# Patient Record
Sex: FEMALE | Race: BLACK OR AFRICAN AMERICAN | NOT HISPANIC OR LATINO | Employment: UNEMPLOYED | RURAL
[De-identification: names, ages, dates, MRNs, and addresses within clinical notes are randomized per-mention and may not be internally consistent; named-entity substitution may affect disease eponyms.]

---

## 2018-02-23 LAB — CRC RECOMMENDATION EXT: NORMAL

## 2021-05-29 DIAGNOSIS — M54.50 LOW BACK PAIN: Primary | ICD-10-CM

## 2021-06-01 ENCOUNTER — OFFICE VISIT (OUTPATIENT)
Dept: FAMILY MEDICINE | Facility: CLINIC | Age: 56
End: 2021-06-01
Payer: OTHER GOVERNMENT

## 2021-06-01 VITALS
HEIGHT: 69 IN | SYSTOLIC BLOOD PRESSURE: 110 MMHG | RESPIRATION RATE: 16 BRPM | HEART RATE: 80 BPM | BODY MASS INDEX: 31.7 KG/M2 | RESPIRATION RATE: 16 BRPM | DIASTOLIC BLOOD PRESSURE: 78 MMHG | WEIGHT: 214 LBS | DIASTOLIC BLOOD PRESSURE: 79 MMHG | HEIGHT: 69 IN | WEIGHT: 215 LBS | SYSTOLIC BLOOD PRESSURE: 124 MMHG | HEART RATE: 86 BPM | BODY MASS INDEX: 31.84 KG/M2

## 2021-06-01 DIAGNOSIS — I86.8 SPIDER VARICOSE VEIN: Primary | Chronic | ICD-10-CM

## 2021-06-01 DIAGNOSIS — I10 BENIGN HYPERTENSION: ICD-10-CM

## 2021-06-01 DIAGNOSIS — M54.9 DORSALGIA, UNSPECIFIED: ICD-10-CM

## 2021-06-01 DIAGNOSIS — M54.9 BACK PAIN, UNSPECIFIED BACK LOCATION, UNSPECIFIED BACK PAIN LATERALITY, UNSPECIFIED CHRONICITY: ICD-10-CM

## 2021-06-01 LAB
ALBUMIN SERPL BCP-MCNC: 3.6 G/DL (ref 3.5–5)
ALBUMIN/GLOB SERPL: 0.9 {RATIO}
ALP SERPL-CCNC: 72 U/L (ref 41–108)
ALT SERPL W P-5'-P-CCNC: 33 U/L (ref 13–56)
ANION GAP SERPL CALCULATED.3IONS-SCNC: 9 MMOL/L (ref 7–16)
AST SERPL W P-5'-P-CCNC: 25 U/L (ref 15–37)
BILIRUB SERPL-MCNC: 0.3 MG/DL (ref 0–1.2)
BUN SERPL-MCNC: 11 MG/DL (ref 7–18)
BUN/CREAT SERPL: 12 (ref 6–20)
CALCIUM SERPL-MCNC: 9.2 MG/DL (ref 8.5–10.1)
CHLORIDE SERPL-SCNC: 104 MMOL/L (ref 98–107)
CHOLEST SERPL-MCNC: 157 MG/DL (ref 0–200)
CHOLEST/HDLC SERPL: 4.1 {RATIO}
CO2 SERPL-SCNC: 30 MMOL/L (ref 21–32)
CREAT SERPL-MCNC: 0.95 MG/DL (ref 0.55–1.02)
GLOBULIN SER-MCNC: 4.2 G/DL (ref 2–4)
GLUCOSE SERPL-MCNC: 84 MG/DL (ref 74–106)
HDLC SERPL-MCNC: 38 MG/DL (ref 40–60)
LDLC SERPL CALC-MCNC: 100 MG/DL
LDLC/HDLC SERPL: 2.6 {RATIO}
NONHDLC SERPL-MCNC: 119 MG/DL
POTASSIUM SERPL-SCNC: 3.3 MMOL/L (ref 3.5–5.1)
PROT SERPL-MCNC: 7.8 G/DL (ref 6.4–8.2)
SODIUM SERPL-SCNC: 140 MMOL/L (ref 136–145)
TRIGL SERPL-MCNC: 94 MG/DL (ref 35–150)
VLDLC SERPL-MCNC: 19 MG/DL

## 2021-06-01 PROCEDURE — 80061 LIPID PANEL: CPT | Mod: ,,, | Performed by: CLINICAL MEDICAL LABORATORY

## 2021-06-01 PROCEDURE — 99213 OFFICE O/P EST LOW 20 MIN: CPT | Mod: ,,, | Performed by: NURSE PRACTITIONER

## 2021-06-01 PROCEDURE — 80053 COMPREHENSIVE METABOLIC PANEL: ICD-10-PCS | Mod: ,,, | Performed by: CLINICAL MEDICAL LABORATORY

## 2021-06-01 PROCEDURE — 80061 LIPID PANEL: ICD-10-PCS | Mod: ,,, | Performed by: CLINICAL MEDICAL LABORATORY

## 2021-06-01 PROCEDURE — 99213 PR OFFICE/OUTPT VISIT, EST, LEVL III, 20-29 MIN: ICD-10-PCS | Mod: ,,, | Performed by: NURSE PRACTITIONER

## 2021-06-01 PROCEDURE — 80053 COMPREHEN METABOLIC PANEL: CPT | Mod: ,,, | Performed by: CLINICAL MEDICAL LABORATORY

## 2021-06-01 RX ORDER — CHOLECALCIFEROL (VITAMIN D3) 1250 MCG
1 TABLET ORAL WEEKLY
COMMUNITY
End: 2022-07-21 | Stop reason: ALTCHOICE

## 2021-06-01 RX ORDER — MULTIVITAMIN
1 TABLET ORAL DAILY
COMMUNITY
End: 2022-07-21 | Stop reason: SDUPTHER

## 2021-06-01 RX ORDER — HYDROCHLOROTHIAZIDE 25 MG/1
25 TABLET ORAL DAILY
COMMUNITY
End: 2022-07-21 | Stop reason: SDUPTHER

## 2021-06-01 RX ORDER — CETIRIZINE HYDROCHLORIDE 10 MG/1
10 TABLET ORAL EVERY OTHER DAY
COMMUNITY
End: 2022-07-21 | Stop reason: SDUPTHER

## 2022-07-20 RX ORDER — CHOLECALCIFEROL (VITAMIN D3) 1250 MCG
1 TABLET ORAL WEEKLY
Qty: 15 TABLET | Refills: 1 | Status: CANCELLED | OUTPATIENT
Start: 2022-07-20

## 2022-07-20 RX ORDER — HYDROCHLOROTHIAZIDE 25 MG/1
25 TABLET ORAL DAILY
Qty: 90 TABLET | Refills: 1 | Status: CANCELLED | OUTPATIENT
Start: 2022-07-20

## 2022-07-20 RX ORDER — CETIRIZINE HYDROCHLORIDE 10 MG/1
10 TABLET ORAL EVERY OTHER DAY
Qty: 90 TABLET | Refills: 1 | Status: CANCELLED | OUTPATIENT
Start: 2022-07-20

## 2022-07-21 ENCOUNTER — OFFICE VISIT (OUTPATIENT)
Dept: FAMILY MEDICINE | Facility: CLINIC | Age: 57
End: 2022-07-21
Payer: OTHER GOVERNMENT

## 2022-07-21 VITALS
RESPIRATION RATE: 16 BRPM | HEIGHT: 70 IN | WEIGHT: 215 LBS | BODY MASS INDEX: 30.78 KG/M2 | SYSTOLIC BLOOD PRESSURE: 133 MMHG | HEART RATE: 56 BPM | DIASTOLIC BLOOD PRESSURE: 79 MMHG

## 2022-07-21 DIAGNOSIS — M21.612 BUNION OF LEFT FOOT: ICD-10-CM

## 2022-07-21 DIAGNOSIS — R73.01 IFG (IMPAIRED FASTING GLUCOSE): ICD-10-CM

## 2022-07-21 DIAGNOSIS — I10 BENIGN HYPERTENSION: Primary | ICD-10-CM

## 2022-07-21 DIAGNOSIS — Z11.59 SCREENING FOR VIRAL DISEASE: ICD-10-CM

## 2022-07-21 LAB
CREAT UR-MCNC: 131 MG/DL (ref 28–219)
EST. AVERAGE GLUCOSE BLD GHB EST-MCNC: 94 MG/DL
HBA1C MFR BLD HPLC: 5.4 % (ref 4.5–6.6)
HCV AB SER QL: NORMAL
HIV 1+O+2 AB SERPL QL: NORMAL
MICROALBUMIN UR-MCNC: 0.6 MG/DL (ref 0–2.8)
MICROALBUMIN/CREAT RATIO PNL UR: 4.6 MG/G (ref 0–30)

## 2022-07-21 PROCEDURE — 82570 ASSAY OF URINE CREATININE: CPT | Mod: ,,, | Performed by: CLINICAL MEDICAL LABORATORY

## 2022-07-21 PROCEDURE — 87389 HIV 1 / 2 ANTIBODY: ICD-10-PCS | Mod: ,,, | Performed by: CLINICAL MEDICAL LABORATORY

## 2022-07-21 PROCEDURE — 83036 HEMOGLOBIN A1C: ICD-10-PCS | Mod: ,,, | Performed by: CLINICAL MEDICAL LABORATORY

## 2022-07-21 PROCEDURE — 87389 HIV-1 AG W/HIV-1&-2 AB AG IA: CPT | Mod: ,,, | Performed by: CLINICAL MEDICAL LABORATORY

## 2022-07-21 PROCEDURE — 82043 MICROALBUMIN / CREATININE RATIO URINE: ICD-10-PCS | Mod: ,,, | Performed by: CLINICAL MEDICAL LABORATORY

## 2022-07-21 PROCEDURE — 83036 HEMOGLOBIN GLYCOSYLATED A1C: CPT | Mod: ,,, | Performed by: CLINICAL MEDICAL LABORATORY

## 2022-07-21 PROCEDURE — 82043 UR ALBUMIN QUANTITATIVE: CPT | Mod: ,,, | Performed by: CLINICAL MEDICAL LABORATORY

## 2022-07-21 PROCEDURE — 86803 HEPATITIS C ANTIBODY: ICD-10-PCS | Mod: ,,, | Performed by: CLINICAL MEDICAL LABORATORY

## 2022-07-21 PROCEDURE — 99214 PR OFFICE/OUTPT VISIT, EST, LEVL IV, 30-39 MIN: ICD-10-PCS | Mod: ,,, | Performed by: NURSE PRACTITIONER

## 2022-07-21 PROCEDURE — 86803 HEPATITIS C AB TEST: CPT | Mod: ,,, | Performed by: CLINICAL MEDICAL LABORATORY

## 2022-07-21 PROCEDURE — 99214 OFFICE O/P EST MOD 30 MIN: CPT | Mod: ,,, | Performed by: NURSE PRACTITIONER

## 2022-07-21 PROCEDURE — 82570 MICROALBUMIN / CREATININE RATIO URINE: ICD-10-PCS | Mod: ,,, | Performed by: CLINICAL MEDICAL LABORATORY

## 2022-07-21 RX ORDER — MULTIVITAMIN
1 TABLET ORAL DAILY
Qty: 90 TABLET | Refills: 1 | Status: SHIPPED | OUTPATIENT
Start: 2022-07-21 | End: 2023-01-17 | Stop reason: SDUPTHER

## 2022-07-21 RX ORDER — CETIRIZINE HYDROCHLORIDE 10 MG/1
10 TABLET ORAL EVERY OTHER DAY
Qty: 90 TABLET | Refills: 1 | Status: SHIPPED | OUTPATIENT
Start: 2022-07-21 | End: 2023-01-17 | Stop reason: SDUPTHER

## 2022-07-21 RX ORDER — FLUTICASONE PROPIONATE 50 MCG
SPRAY, SUSPENSION (ML) NASAL
COMMUNITY
Start: 2022-06-30 | End: 2022-07-21 | Stop reason: SDUPTHER

## 2022-07-21 RX ORDER — FLUTICASONE PROPIONATE 50 MCG
SPRAY, SUSPENSION (ML) NASAL
Qty: 16 G | Refills: 3 | Status: SHIPPED | OUTPATIENT
Start: 2022-07-21 | End: 2023-01-17 | Stop reason: SDUPTHER

## 2022-07-21 RX ORDER — HYDROCHLOROTHIAZIDE 25 MG/1
25 TABLET ORAL DAILY
Qty: 90 TABLET | Refills: 1 | Status: SHIPPED | OUTPATIENT
Start: 2022-07-21 | End: 2023-01-17 | Stop reason: SDUPTHER

## 2022-07-21 NOTE — PROGRESS NOTES
IZZY Garcia   [facility] [address] [place] [address]  [phone]      PATIENT NAME: Isidra Mcintosh  : 1965  DATE: 22  MRN: [id number]      Billing Provider: IZZY Garcia  Level of Service:   Patient PCP Information     Provider PCP Type    IZZY Garcia General          Reason for Visit / Chief Complaint: Follow-up       Update PCP  Update Chief Complaint         History of Present Illness / Problem Focused Workflow     Isidra Mcintosh presents to the clinic with Follow-up     Pt presents for routine follow up with lab and med refills. Overall she is doing well. No complaints of chest pain or sob.     Health maintenance:  She had a MMG in [place] in July . Is scheduled for follow up in Aug 2023  Colonoscopy: done in [place] pt will bring paperwork.   Previous lab reviewed         Review of Systems     Review of Systems   Constitutional: Negative for activity change and unexpected weight change.   HENT: Negative for hearing loss, rhinorrhea and trouble swallowing.    Eyes: Negative for discharge and visual disturbance.   Respiratory: Negative for chest tightness and wheezing.    Cardiovascular: Negative for chest pain and palpitations.   Gastrointestinal: Negative for blood in stool, constipation, diarrhea and vomiting.   Endocrine: Negative for polydipsia and polyuria.   Genitourinary: Negative for difficulty urinating, dysuria, hematuria and menstrual problem.   Musculoskeletal: Negative for arthralgias, joint swelling and neck pain.   Neurological: Negative for weakness and headaches.   Psychiatric/Behavioral: Negative for confusion and dysphoric mood.        Medical / Social / Family History     Past Medical History:   Diagnosis Date    Essential hypertension, malignant     Vitamin D deficiency        Past Surgical History:   Procedure Laterality Date     BREAST SURGERY       SECTION      HYSTERECTOMY         Social History  Ms. Mcintosh  reports that she has never smoked. She has never used smokeless tobacco. She reports that she does not drink alcohol and does not use drugs.    Family History  Ms. Mcintosh's family history includes COPD in her father; Diabetes in her father; Heart disease in her maternal grandmother and maternal uncle; Hypertension in her father and mother; Kidney disease in her father.    Medications and Allergies     Medications  Outpatient Medications Marked as Taking for the 22 encounter (Office Visit) with IZZY Garcia   Medication Sig Dispense Refill    [DISCONTINUED] cetirizine (ZYRTEC) 10 MG tablet Take 10 mg by mouth every other day.      [DISCONTINUED] fluticasone propionate (FLONASE) 50 mcg/actuation nasal spray SMARTSI-2 Spray(s) Both Nares Daily PRN      [DISCONTINUED] hydroCHLOROthiazide (HYDRODIURIL) 25 MG tablet Take 25 mg by mouth once daily.      [DISCONTINUED] multivitamin (THERAGRAN) per tablet Take 1 tablet by mouth once daily.         Allergies  Review of patient's allergies indicates:  No Known Allergies    Physical Examination     Vitals:    22 0821   BP: 133/79   Pulse: (!) 56   Resp: 16     Physical Exam  Eyes:      Pupils: Pupils are equal, round, and reactive to light.   Cardiovascular:      Rate and Rhythm: Normal rate and regular rhythm.      Heart sounds: Normal heart sounds. No murmur heard.  Pulmonary:      Breath sounds: Normal breath sounds. No wheezing, rhonchi or rales.   Abdominal:      General: Bowel sounds are normal.   Musculoskeletal:         General: No swelling.      Cervical back: Normal range of motion and neck supple.   Skin:     General: Skin is warm and dry.   Neurological:      Mental Status: She is alert and oriented to person, place, and time.          Assessment and Plan (including Health Maintenance)      Problem List  Smart Sets  Document Outside HM    :    Plan:         Health Maintenance Due   Topic Date Due    Hepatitis C Screening  Never done    HIV Screening  Never done    TETANUS VACCINE  Never done    Mammogram  Never done    Colorectal Cancer Screening  Never done    Shingles Vaccine (1 of 2) Never done    COVID-19 Vaccine (4 - Booster) 03/29/2022       Problem List Items Addressed This Visit    None     Visit Diagnoses     Benign hypertension    -  Primary    bp appears well controlled today  cont home meds    Relevant Medications    hydroCHLOROthiazide (HYDRODIURIL) 25 MG tablet    Other Relevant Orders    Microalbumin/Creatinine Ratio, Urine    IFG (impaired fasting glucose)        Relevant Orders    Hemoglobin A1C    Screening for viral disease        Relevant Orders    Hepatitis C Antibody    HIV 1/2 Ag/Ab (4th Gen)    Bunion of left foot        Relevant Orders    Ambulatory referral/consult to Orthopedics          Health Maintenance Topics with due status: Not Due       Topic Last Completion Date    Influenza Vaccine 01/19/2018    Lipid Panel 06/01/2021       Future Appointments   Date Time Provider Department Center   1/18/2023  8:20 AM IZZY Garcia [facility] [place]            Signature:  IZZY Garcia  [facility] MEDICAL [address] [place] [address]  [phone]    Date of encounter: 7/21/22

## 2023-01-17 NOTE — PROGRESS NOTES
6 mth f/u HTN  Answers submitted by the patient for this visit:  Review of Systems Questionnaire (Submitted on 1/16/2023)  activity change: No  unexpected weight change: No  neck pain: No  hearing loss: No  rhinorrhea: No  trouble swallowing: No  eye discharge: No  visual disturbance: No  chest tightness: No  wheezing: No  chest pain: No  palpitations: No  blood in stool: No  constipation: No  vomiting: No  diarrhea: No  polydipsia: No  polyuria: No  difficulty urinating: No  hematuria: No  menstrual problem: No  dysuria: No  joint swelling: No  arthralgias: No  headaches: No  weakness: No  confusion: No  dysphoric mood: No     Attending with

## 2023-01-18 ENCOUNTER — OFFICE VISIT (OUTPATIENT)
Dept: FAMILY MEDICINE | Facility: CLINIC | Age: 58
End: 2023-01-18
Payer: OTHER GOVERNMENT

## 2023-01-18 VITALS
RESPIRATION RATE: 16 BRPM | WEIGHT: 207 LBS | TEMPERATURE: 98 F | OXYGEN SATURATION: 100 % | HEART RATE: 80 BPM | BODY MASS INDEX: 30.66 KG/M2 | DIASTOLIC BLOOD PRESSURE: 92 MMHG | HEIGHT: 69 IN | SYSTOLIC BLOOD PRESSURE: 130 MMHG

## 2023-01-18 DIAGNOSIS — I10 BENIGN HYPERTENSION: ICD-10-CM

## 2023-01-18 DIAGNOSIS — N94.89 UTERINE PAIN: ICD-10-CM

## 2023-01-18 DIAGNOSIS — I10 BENIGN HYPERTENSION: Primary | ICD-10-CM

## 2023-01-18 DIAGNOSIS — M54.42 CHRONIC BILATERAL LOW BACK PAIN WITH BILATERAL SCIATICA: ICD-10-CM

## 2023-01-18 DIAGNOSIS — M54.41 CHRONIC BILATERAL LOW BACK PAIN WITH BILATERAL SCIATICA: ICD-10-CM

## 2023-01-18 DIAGNOSIS — G89.29 CHRONIC BILATERAL LOW BACK PAIN WITH BILATERAL SCIATICA: ICD-10-CM

## 2023-01-18 LAB
ALBUMIN SERPL BCP-MCNC: 3.9 G/DL (ref 3.5–5)
ALBUMIN/GLOB SERPL: 0.9 {RATIO}
ALP SERPL-CCNC: 92 U/L (ref 46–118)
ALT SERPL W P-5'-P-CCNC: 23 U/L (ref 13–56)
ANION GAP SERPL CALCULATED.3IONS-SCNC: 12 MMOL/L (ref 7–16)
AST SERPL W P-5'-P-CCNC: 21 U/L (ref 15–37)
BASOPHILS # BLD AUTO: 0.02 K/UL (ref 0–0.2)
BASOPHILS NFR BLD AUTO: 0.5 % (ref 0–1)
BILIRUB SERPL-MCNC: 0.5 MG/DL (ref ?–1.2)
BUN SERPL-MCNC: 12 MG/DL (ref 7–18)
BUN/CREAT SERPL: 12 (ref 6–20)
CALCIUM SERPL-MCNC: 9.8 MG/DL (ref 8.5–10.1)
CHLORIDE SERPL-SCNC: 103 MMOL/L (ref 98–107)
CHOLEST SERPL-MCNC: 170 MG/DL (ref 0–200)
CHOLEST/HDLC SERPL: 4.3 {RATIO}
CO2 SERPL-SCNC: 31 MMOL/L (ref 21–32)
CREAT SERPL-MCNC: 1.04 MG/DL (ref 0.55–1.02)
DIFFERENTIAL METHOD BLD: ABNORMAL
EGFR (NO RACE VARIABLE) (RUSH/TITUS): 64 ML/MIN/1.73M²
EOSINOPHIL # BLD AUTO: 0.02 K/UL (ref 0–0.5)
EOSINOPHIL NFR BLD AUTO: 0.5 % (ref 1–4)
ERYTHROCYTE [DISTWIDTH] IN BLOOD BY AUTOMATED COUNT: 13.3 % (ref 11.5–14.5)
GLOBULIN SER-MCNC: 4.2 G/DL (ref 2–4)
GLUCOSE SERPL-MCNC: 101 MG/DL (ref 74–106)
HCT VFR BLD AUTO: 43.2 % (ref 38–47)
HDLC SERPL-MCNC: 40 MG/DL (ref 40–60)
HGB BLD-MCNC: 14 G/DL (ref 12–16)
IMM GRANULOCYTES # BLD AUTO: 0 K/UL (ref 0–0.04)
IMM GRANULOCYTES NFR BLD: 0 % (ref 0–0.4)
LDLC SERPL CALC-MCNC: 101 MG/DL
LDLC/HDLC SERPL: 2.5 {RATIO}
LYMPHOCYTES # BLD AUTO: 1.51 K/UL (ref 1–4.8)
LYMPHOCYTES NFR BLD AUTO: 36.9 % (ref 27–41)
MCH RBC QN AUTO: 27.5 PG (ref 27–31)
MCHC RBC AUTO-ENTMCNC: 32.4 G/DL (ref 32–36)
MCV RBC AUTO: 84.9 FL (ref 80–96)
MONOCYTES # BLD AUTO: 0.3 K/UL (ref 0–0.8)
MONOCYTES NFR BLD AUTO: 7.3 % (ref 2–6)
MPC BLD CALC-MCNC: 9.5 FL (ref 9.4–12.4)
NEUTROPHILS # BLD AUTO: 2.24 K/UL (ref 1.8–7.7)
NEUTROPHILS NFR BLD AUTO: 54.8 % (ref 53–65)
NONHDLC SERPL-MCNC: 130 MG/DL
NRBC # BLD AUTO: 0 X10E3/UL
NRBC, AUTO (.00): 0 %
PLATELET # BLD AUTO: 300 K/UL (ref 150–400)
POTASSIUM SERPL-SCNC: 3.5 MMOL/L (ref 3.5–5.1)
PROT SERPL-MCNC: 8.1 G/DL (ref 6.4–8.2)
RBC # BLD AUTO: 5.09 M/UL (ref 4.2–5.4)
SODIUM SERPL-SCNC: 142 MMOL/L (ref 136–145)
TRIGL SERPL-MCNC: 147 MG/DL (ref 35–150)
VLDLC SERPL-MCNC: 29 MG/DL
WBC # BLD AUTO: 4.09 K/UL (ref 4.5–11)

## 2023-01-18 PROCEDURE — 80053 COMPREHEN METABOLIC PANEL: CPT | Mod: ,,, | Performed by: CLINICAL MEDICAL LABORATORY

## 2023-01-18 PROCEDURE — 99213 PR OFFICE/OUTPT VISIT, EST, LEVL III, 20-29 MIN: ICD-10-PCS | Mod: ,,, | Performed by: NURSE PRACTITIONER

## 2023-01-18 PROCEDURE — 85025 COMPLETE CBC W/AUTO DIFF WBC: CPT | Mod: ,,, | Performed by: CLINICAL MEDICAL LABORATORY

## 2023-01-18 PROCEDURE — 80061 LIPID PANEL: ICD-10-PCS | Mod: ,,, | Performed by: CLINICAL MEDICAL LABORATORY

## 2023-01-18 PROCEDURE — 80053 COMPREHENSIVE METABOLIC PANEL: ICD-10-PCS | Mod: ,,, | Performed by: CLINICAL MEDICAL LABORATORY

## 2023-01-18 PROCEDURE — 85025 CBC WITH DIFFERENTIAL: ICD-10-PCS | Mod: ,,, | Performed by: CLINICAL MEDICAL LABORATORY

## 2023-01-18 PROCEDURE — 80061 LIPID PANEL: CPT | Mod: ,,, | Performed by: CLINICAL MEDICAL LABORATORY

## 2023-01-18 PROCEDURE — 99213 OFFICE O/P EST LOW 20 MIN: CPT | Mod: ,,, | Performed by: NURSE PRACTITIONER

## 2023-01-18 RX ORDER — CETIRIZINE HYDROCHLORIDE 10 MG/1
10 TABLET ORAL EVERY OTHER DAY
Qty: 90 TABLET | Refills: 1 | Status: SHIPPED | OUTPATIENT
Start: 2023-01-18 | End: 2023-07-13 | Stop reason: SDUPTHER

## 2023-01-18 RX ORDER — HYDROCHLOROTHIAZIDE 25 MG/1
25 TABLET ORAL DAILY
Qty: 90 TABLET | Refills: 1 | Status: SHIPPED | OUTPATIENT
Start: 2023-01-18 | End: 2023-07-13 | Stop reason: SDUPTHER

## 2023-01-18 RX ORDER — FLUTICASONE PROPIONATE 50 MCG
SPRAY, SUSPENSION (ML) NASAL
Qty: 16 G | Refills: 3 | Status: SHIPPED | OUTPATIENT
Start: 2023-01-18

## 2023-01-18 RX ORDER — MULTIVITAMIN
1 TABLET ORAL DAILY
Qty: 90 TABLET | Refills: 1 | Status: SHIPPED | OUTPATIENT
Start: 2023-01-18

## 2023-01-18 NOTE — PROGRESS NOTES
IZZY Garcia   [facility] [address] [place] [address]  [phone]      PATIENT NAME: Isidra Mcintosh  : 1965  DATE: 23  MRN: [id number]      Billing Provider: IZZY Garcia  Level of Service:   Patient PCP Information       Provider PCP Type    IZZY Garcia General            Reason for Visit / Chief Complaint: Follow-up and Hypertension       Update PCP  Update Chief Complaint         History of Present Illness / Problem Focused Workflow     Isidra Mcintosh presents to the clinic with Follow-up and Hypertension     Pt presents for routine follow up with lab and med refills. Overall doing well.      BP appears well controlled today. Home meds reviewed and will refill.  Advised to monitor BP at home. Advised on optimal BP readings - SBP < 130 & DBP < 80. Advised to call office for any persistent BP elevation and may have to prescribe or adjust BP med(s).Recommended DASH diet, stay well hydrated with water daily, eliminate or decrease caffeinated and high calorie drinks, increase physical activity, and lose weight if BMI > 25.0.    Pt reports acute on chronic back pain that radiates into the hip and lower back. Discussed decreasing weights while exercising.     Pt also has complaints of vaginal/uterine pain with occasional spotting of blood. She reports a recent injury that is healing. Will refer to gyn for evaluation and care.         Review of Systems     Review of Systems   Constitutional:  Negative for activity change and unexpected weight change.   HENT:  Negative for hearing loss, rhinorrhea and trouble swallowing.    Eyes:  Negative for discharge and visual disturbance.   Respiratory:  Negative for chest tightness and wheezing.    Cardiovascular:  Negative for chest pain and palpitations.   Gastrointestinal:  Negative for blood in stool, constipation, diarrhea and  vomiting.   Endocrine: Negative for polydipsia and polyuria.   Genitourinary:  Negative for difficulty urinating, dysuria, hematuria and menstrual problem.   Musculoskeletal:  Negative for arthralgias, joint swelling and neck pain.   Neurological:  Negative for weakness and headaches.   Psychiatric/Behavioral:  Negative for confusion and dysphoric mood.       Medical / Social / Family History     Past Medical History:   Diagnosis Date    Essential hypertension, malignant     Vitamin D deficiency        Past Surgical History:   Procedure Laterality Date    BREAST SURGERY       SECTION      HYSTERECTOMY         Social History  Ms. Mcintosh  reports that she has never smoked. She has never used smokeless tobacco. She reports that she does not drink alcohol and does not use drugs.    Family History  Ms. Mcintosh's family history includes COPD in her father; Diabetes in her father; Heart disease in her maternal grandmother and maternal uncle; Hypertension in her father and mother; Kidney disease in her father.    Medications and Allergies     Medications  No outpatient medications have been marked as taking for the 23 encounter (Office Visit) with IZZY Garcia.       Allergies  Review of patient's allergies indicates:  No Known Allergies    Physical Examination     Vitals:    23 0829   BP: (!) 130/92   Pulse:    Resp:    Temp:      Physical Exam  Eyes:      Pupils: Pupils are equal, round, and reactive to light.   Cardiovascular:      Rate and Rhythm: Normal rate and regular rhythm.      Heart sounds: Normal heart sounds. No murmur heard.  Pulmonary:      Breath sounds: Normal breath sounds. No wheezing, rhonchi or rales.   Abdominal:      General: Bowel sounds are normal.   Musculoskeletal:         General: No swelling.      Cervical back: Normal range of motion and neck supple.   Skin:     General: Skin is warm and dry.   Neurological:      Mental Status: She is alert and oriented to  person, place, and time.        Assessment and Plan (including Health Maintenance)      Problem List  Smart Sets  Document Outside HM   :    Plan:   1. Benign hypertension  Overview:  bp appears well controlled today  cont home meds    Assessment & Plan:  The current medical regimen is effective;  continue present plan and medications.    Orders:  -     CBC Auto Differential; Future; Expected date: 2023  -     Comprehensive Metabolic Panel; Future; Expected date: 2023  -     Lipid Panel; Future; Expected date: 2023  -     hydroCHLOROthiazide (HYDRODIURIL) 25 MG tablet; Take 1 tablet (25 mg total) by mouth once daily.  Dispense: 90 tablet; Refill: 1    2. Benign hypertension  Comments:  bp appears well controlled today  cont home meds  Overview:  bp appears well controlled today  cont home meds    Assessment & Plan:  The current medical regimen is effective;  continue present plan and medications.    Orders:  -     CBC Auto Differential; Future; Expected date: 2023  -     Comprehensive Metabolic Panel; Future; Expected date: 2023  -     Lipid Panel; Future; Expected date: 2023  -     hydroCHLOROthiazide (HYDRODIURIL) 25 MG tablet; Take 1 tablet (25 mg total) by mouth once daily.  Dispense: 90 tablet; Refill: 1    3. Uterine pain  -     Ambulatory referral/consult to Gynecology; Future; Expected date: 2023    4. Chronic bilateral low back pain with bilateral sciatica  Comments:  avoid back strain and heavy lifting     Other orders  -     cetirizine (ZYRTEC) 10 MG tablet; Take 1 tablet (10 mg total) by mouth every other day.  Dispense: 90 tablet; Refill: 1  -     multivitamin (THERAGRAN) per tablet; Take 1 tablet by mouth once daily.  Dispense: 90 tablet; Refill: 1  -     fluticasone propionate (FLONASE) 50 mcg/actuation nasal spray; SMARTSI-2 Spray(s) Both Nares Daily PRN  Dispense: 16 g; Refill: 3           Health Maintenance Due   Topic Date Due    Mammogram  Never done     Colorectal Cancer Screening  Never done       Problem List Items Addressed This Visit          Cardiac/Vascular    Benign hypertension - Primary    Overview     bp appears well controlled today  cont home meds         Current Assessment & Plan     The current medical regimen is effective;  continue present plan and medications.         Relevant Medications    hydroCHLOROthiazide (HYDRODIURIL) 25 MG tablet    Other Relevant Orders    CBC Auto Differential    Comprehensive Metabolic Panel    Lipid Panel     Other Visit Diagnoses       Uterine pain        Relevant Orders    Ambulatory referral/consult to Gynecology    Chronic bilateral low back pain with bilateral sciatica        avoid back strain and heavy lifting             Health Maintenance Topics with due status: Not Due       Topic Last Completion Date    Lipid Panel 06/01/2021    Diabetes Urine Screening 07/21/2022    Hemoglobin A1c 07/21/2022       Future Appointments   Date Time Provider Department Center   7/17/2023  3:20 PM IZZY Garcia [facility] [place]            Signature:  IZZY Garcia [facility] [address] [place] [address]  [phone]    Date of encounter: 1/18/23

## 2023-01-23 ENCOUNTER — PATIENT OUTREACH (OUTPATIENT)
Dept: FAMILY MEDICINE | Facility: CLINIC | Age: 58
End: 2023-01-23
Payer: OTHER GOVERNMENT

## 2023-01-23 PROBLEM — Z86.0100 HISTORY OF COLON POLYPS: Status: ACTIVE | Noted: 2018-01-01

## 2023-01-23 PROBLEM — Z86.010 HISTORY OF COLON POLYPS: Status: ACTIVE | Noted: 2018-01-01

## 2023-02-28 ENCOUNTER — OFFICE VISIT (OUTPATIENT)
Dept: OBSTETRICS AND GYNECOLOGY | Facility: CLINIC | Age: 58
End: 2023-02-28
Payer: OTHER GOVERNMENT

## 2023-02-28 VITALS
WEIGHT: 207.81 LBS | HEART RATE: 71 BPM | DIASTOLIC BLOOD PRESSURE: 78 MMHG | SYSTOLIC BLOOD PRESSURE: 120 MMHG | BODY MASS INDEX: 30.69 KG/M2

## 2023-02-28 DIAGNOSIS — N36.8 URETHRAL PROLAPSE: Primary | ICD-10-CM

## 2023-02-28 PROCEDURE — 99203 PR OFFICE/OUTPT VISIT, NEW, LEVL III, 30-44 MIN: ICD-10-PCS | Mod: ,,, | Performed by: OBSTETRICS & GYNECOLOGY

## 2023-02-28 PROCEDURE — 99203 OFFICE O/P NEW LOW 30 MIN: CPT | Mod: ,,, | Performed by: OBSTETRICS & GYNECOLOGY

## 2023-02-28 RX ORDER — ESTRADIOL 0.1 MG/G
0.5 CREAM VAGINAL
Qty: 42.5 G | Refills: 1 | Status: SHIPPED | OUTPATIENT
Start: 2023-03-01 | End: 2024-02-29

## 2023-02-28 NOTE — PROGRESS NOTES
History & Physical    SUBJECTIVE:     History of Present Illness:  Patient is a 58 y.o. female presents with complaint of vaginal bleeding or urethral bleeding. She says that several years ago her urethra was torn during intercourse and she was told that it would heal on it's own.  She is here today to have her urethra checked.   Chief Complaint   Patient presents with    Vaginal Bleeding    bladder leakage       Review of patient's allergies indicates:  No Known Allergies    Current Outpatient Medications   Medication Sig Dispense Refill    cetirizine (ZYRTEC) 10 MG tablet Take 1 tablet (10 mg total) by mouth every other day. 90 tablet 1    fluticasone propionate (FLONASE) 50 mcg/actuation nasal spray SMARTSI-2 Spray(s) Both Nares Daily PRN 16 g 3    hydroCHLOROthiazide (HYDRODIURIL) 25 MG tablet Take 1 tablet (25 mg total) by mouth once daily. 90 tablet 1    multivitamin (THERAGRAN) per tablet Take 1 tablet by mouth once daily. 90 tablet 1    [START ON 3/1/2023] estradioL (ESTRACE) 0.01 % (0.1 mg/gram) vaginal cream Place 0.5 g vaginally 3 (three) times a week. 42.5 g 1     No current facility-administered medications for this visit.       Past Medical History:   Diagnosis Date    Essential hypertension, malignant     Vitamin D deficiency      Past Surgical History:   Procedure Laterality Date    BREAST SURGERY       SECTION      HYSTERECTOMY       Family History   Problem Relation Age of Onset    Hypertension Mother     Diabetes Father     Hypertension Father     COPD Father     Kidney disease Father     Heart disease Maternal Uncle     Heart disease Maternal Grandmother      Social History     Tobacco Use    Smoking status: Never     Passive exposure: Never    Smokeless tobacco: Never   Substance Use Topics    Alcohol use: Never    Drug use: Never        Review of Systems:  Review of Systems   Constitutional:  Negative for appetite change, chills, fatigue and fever.   HENT: Negative.     Eyes:  Negative.    Respiratory:  Negative for cough, chest tightness and shortness of breath.    Cardiovascular:  Negative for chest pain, palpitations and leg swelling.   Gastrointestinal:  Negative for abdominal distention, abdominal pain, blood in stool, constipation, diarrhea, nausea and vomiting.   Endocrine: Negative for cold intolerance, heat intolerance, polydipsia, polyphagia and polyuria.   Genitourinary:  Negative for difficulty urinating, dyspareunia, dysuria, flank pain, frequency, pelvic pain, urgency, vaginal bleeding, vaginal discharge and vaginal pain.   Musculoskeletal: Negative.    Skin: Negative.    Neurological: Negative.    Psychiatric/Behavioral: Negative.  Negative for agitation, behavioral problems, confusion and sleep disturbance. The patient is not nervous/anxious.      OBJECTIVE:     Vital Signs (Most Recent)  Pulse: 71 (02/28/23 0858)  BP: 120/78 (02/28/23 0858)     94.3 kg (207 lb 12.8 oz)     Physical Exam:  Physical Exam  Vitals reviewed. Exam conducted with a chaperone present.   Constitutional:       Appearance: Normal appearance.   HENT:      Head: Normocephalic and atraumatic.      Mouth/Throat:      Mouth: Mucous membranes are moist.   Eyes:      Extraocular Movements: Extraocular movements intact.      Pupils: Pupils are equal, round, and reactive to light.   Cardiovascular:      Rate and Rhythm: Normal rate and regular rhythm.      Pulses: Normal pulses.      Heart sounds: Normal heart sounds.   Pulmonary:      Effort: Pulmonary effort is normal.      Breath sounds: Normal breath sounds.   Abdominal:      General: Abdomen is flat. Bowel sounds are normal.      Palpations: Abdomen is soft.   Genitourinary:     Urethra: Prolapse present.   Musculoskeletal:         General: Normal range of motion.      Cervical back: Normal range of motion.   Skin:     General: Skin is warm and dry.   Neurological:      General: No focal deficit present.      Mental Status: She is alert and oriented to  person, place, and time.   Psychiatric:         Mood and Affect: Mood normal.         Behavior: Behavior normal.         Thought Content: Thought content normal.         Judgment: Judgment normal.         ASSESSMENT/PLAN:   Isidra was seen today for vaginal bleeding and bladder leakage.    Diagnoses and all orders for this visit:    Urethral prolapse  -     estradioL (ESTRACE) 0.01 % (0.1 mg/gram) vaginal cream; Place 0.5 g vaginally 3 (three) times a week.        PLAN:Plan      RTC in 3 months

## 2023-07-17 ENCOUNTER — OFFICE VISIT (OUTPATIENT)
Dept: FAMILY MEDICINE | Facility: CLINIC | Age: 58
End: 2023-07-17
Payer: OTHER GOVERNMENT

## 2023-07-17 VITALS
WEIGHT: 218 LBS | OXYGEN SATURATION: 100 % | RESPIRATION RATE: 16 BRPM | HEIGHT: 69 IN | DIASTOLIC BLOOD PRESSURE: 89 MMHG | BODY MASS INDEX: 32.29 KG/M2 | TEMPERATURE: 98 F | SYSTOLIC BLOOD PRESSURE: 144 MMHG | HEART RATE: 68 BPM

## 2023-07-17 DIAGNOSIS — R73.01 IFG (IMPAIRED FASTING GLUCOSE): ICD-10-CM

## 2023-07-17 DIAGNOSIS — I10 BENIGN HYPERTENSION: Primary | ICD-10-CM

## 2023-07-17 LAB
ALBUMIN SERPL BCP-MCNC: 3.5 G/DL (ref 3.5–5)
ALBUMIN/GLOB SERPL: 0.8 {RATIO}
ALP SERPL-CCNC: 86 U/L (ref 46–118)
ALT SERPL W P-5'-P-CCNC: 25 U/L (ref 13–56)
ANION GAP SERPL CALCULATED.3IONS-SCNC: 10 MMOL/L (ref 7–16)
AST SERPL W P-5'-P-CCNC: 21 U/L (ref 15–37)
BILIRUB SERPL-MCNC: 0.3 MG/DL (ref ?–1.2)
BUN SERPL-MCNC: 17 MG/DL (ref 7–18)
BUN/CREAT SERPL: 16 (ref 6–20)
CALCIUM SERPL-MCNC: 9.8 MG/DL (ref 8.5–10.1)
CHLORIDE SERPL-SCNC: 104 MMOL/L (ref 98–107)
CHOLEST SERPL-MCNC: 176 MG/DL (ref 0–200)
CHOLEST/HDLC SERPL: 4.8 {RATIO}
CO2 SERPL-SCNC: 29 MMOL/L (ref 21–32)
CREAT SERPL-MCNC: 1.05 MG/DL (ref 0.55–1.02)
CREAT UR-MCNC: 159 MG/DL (ref 28–219)
EGFR (NO RACE VARIABLE) (RUSH/TITUS): 62 ML/MIN/1.73M2
EST. AVERAGE GLUCOSE BLD GHB EST-MCNC: 97 MG/DL
GLOBULIN SER-MCNC: 4.4 G/DL (ref 2–4)
GLUCOSE SERPL-MCNC: 92 MG/DL (ref 74–106)
HBA1C MFR BLD HPLC: 5.5 % (ref 4.5–6.6)
HDLC SERPL-MCNC: 37 MG/DL (ref 40–60)
LDLC SERPL CALC-MCNC: 107 MG/DL
LDLC/HDLC SERPL: 2.9 {RATIO}
MICROALBUMIN UR-MCNC: 0.6 MG/DL (ref 0–2.8)
MICROALBUMIN/CREAT RATIO PNL UR: 3.8 MG/G (ref 0–30)
NONHDLC SERPL-MCNC: 139 MG/DL
POTASSIUM SERPL-SCNC: 3.5 MMOL/L (ref 3.5–5.1)
PROT SERPL-MCNC: 7.9 G/DL (ref 6.4–8.2)
SODIUM SERPL-SCNC: 139 MMOL/L (ref 136–145)
TRIGL SERPL-MCNC: 162 MG/DL (ref 35–150)
VLDLC SERPL-MCNC: 32 MG/DL

## 2023-07-17 PROCEDURE — 83036 HEMOGLOBIN A1C: ICD-10-PCS | Mod: ,,, | Performed by: CLINICAL MEDICAL LABORATORY

## 2023-07-17 PROCEDURE — 99213 OFFICE O/P EST LOW 20 MIN: CPT | Mod: ,,, | Performed by: NURSE PRACTITIONER

## 2023-07-17 PROCEDURE — 80053 COMPREHEN METABOLIC PANEL: CPT | Mod: ,,, | Performed by: CLINICAL MEDICAL LABORATORY

## 2023-07-17 PROCEDURE — 99213 PR OFFICE/OUTPT VISIT, EST, LEVL III, 20-29 MIN: ICD-10-PCS | Mod: ,,, | Performed by: NURSE PRACTITIONER

## 2023-07-17 PROCEDURE — 82570 ASSAY OF URINE CREATININE: CPT | Mod: ,,, | Performed by: CLINICAL MEDICAL LABORATORY

## 2023-07-17 PROCEDURE — 80061 LIPID PANEL: ICD-10-PCS | Mod: ,,, | Performed by: CLINICAL MEDICAL LABORATORY

## 2023-07-17 PROCEDURE — 80053 COMPREHENSIVE METABOLIC PANEL: ICD-10-PCS | Mod: ,,, | Performed by: CLINICAL MEDICAL LABORATORY

## 2023-07-17 PROCEDURE — 82043 MICROALBUMIN / CREATININE RATIO URINE: ICD-10-PCS | Mod: ,,, | Performed by: CLINICAL MEDICAL LABORATORY

## 2023-07-17 PROCEDURE — 83036 HEMOGLOBIN GLYCOSYLATED A1C: CPT | Mod: ,,, | Performed by: CLINICAL MEDICAL LABORATORY

## 2023-07-17 PROCEDURE — 82570 MICROALBUMIN / CREATININE RATIO URINE: ICD-10-PCS | Mod: ,,, | Performed by: CLINICAL MEDICAL LABORATORY

## 2023-07-17 PROCEDURE — 82043 UR ALBUMIN QUANTITATIVE: CPT | Mod: ,,, | Performed by: CLINICAL MEDICAL LABORATORY

## 2023-07-17 PROCEDURE — 80061 LIPID PANEL: CPT | Mod: ,,, | Performed by: CLINICAL MEDICAL LABORATORY

## 2023-07-17 RX ORDER — CETIRIZINE HYDROCHLORIDE 10 MG/1
10 TABLET ORAL EVERY OTHER DAY
Qty: 90 TABLET | Refills: 1 | Status: SHIPPED | OUTPATIENT
Start: 2023-07-17

## 2023-07-17 RX ORDER — HYDROCHLOROTHIAZIDE 25 MG/1
25 TABLET ORAL DAILY
Qty: 90 TABLET | Refills: 1 | Status: SHIPPED | OUTPATIENT
Start: 2023-07-17 | End: 2024-01-17 | Stop reason: SDUPTHER

## 2023-07-17 NOTE — PROGRESS NOTES
IZZY Garcia   [facility] [address] [place] [address]  [phone]      PATIENT NAME: Isidra Mcintosh  : 1965  DATE: 23  MRN: [id number]      Billing Provider: IZZY Garcia  Level of Service:   Patient PCP Information       Provider PCP Type    IZZY Garcia General            Reason for Visit / Chief Complaint: Follow-up and Hypertension       Update PCP  Update Chief Complaint         History of Present Illness / Problem Focused Workflow     Isidra Mcintosh presents to the clinic with Follow-up and Hypertension     Pt presents for routine follow up with lab and med refills. Overall doing well.      BP appears  controlled today. Home meds reviewed  The current medical regimen is effective;  continue present plan and medications. Recommended patient to check home readings to monitor and see me for followup as scheduled or sooner as needed.   Discussed sodium restriction, maintaining ideal body weight and regular exercise program as physiologic means to continue to achieve blood pressure control in addition to medication compliance.  Patient was educated that both decongestant and anti-inflammatory medication may raise blood pressure.        Review of Systems     Review of Systems   Constitutional:  Negative for fatigue and fever.   HENT:  Negative for nasal congestion and sore throat.    Eyes:  Negative for visual disturbance.   Respiratory:  Negative for chest tightness and shortness of breath.    Cardiovascular:  Negative for chest pain and leg swelling.   Gastrointestinal:  Negative for abdominal pain, change in bowel habit and change in bowel habit.   Endocrine: Negative for polydipsia, polyphagia and polyuria.   Genitourinary:  Negative for dysuria and hematuria.   Musculoskeletal:  Negative for back pain and leg pain.   Integumentary:  Negative for rash.    Neurological:  Negative for dizziness, syncope, weakness and light-headedness.      Medical / Social / Family History     Past Medical History:   Diagnosis Date    Essential hypertension, malignant     Vitamin D deficiency        Past Surgical History:   Procedure Laterality Date    BREAST SURGERY       SECTION      HYSTERECTOMY         Social History  Ms. Mcintosh  reports that she has never smoked. She has never been exposed to tobacco smoke. She has never used smokeless tobacco. She reports that she does not drink alcohol and does not use drugs.    Family History  Ms. Mcintosh's family history includes COPD in her father; Diabetes in her father; Heart disease in her maternal grandmother and maternal uncle; Hypertension in her father and mother; Kidney disease in her father.    Medications and Allergies     Medications  Outpatient Medications Marked as Taking for the 23 encounter (Office Visit) with IZZY Garcia   Medication Sig Dispense Refill    estradioL (ESTRACE) 0.01 % (0.1 mg/gram) vaginal cream Place 0.5 g vaginally 3 (three) times a week. 42.5 g 1    fluticasone propionate (FLONASE) 50 mcg/actuation nasal spray SMARTSI-2 Spray(s) Both Nares Daily PRN 16 g 3    multivitamin (THERAGRAN) per tablet Take 1 tablet by mouth once daily. 90 tablet 1       Allergies  Review of patient's allergies indicates:  No Known Allergies    Physical Examination     Vitals:    23 1515   BP: (!) 144/89   Pulse: 68   Resp: 16   Temp: 98.2 °F (36.8 °C)     Physical Exam  Eyes:      Pupils: Pupils are equal, round, and reactive to light.   Cardiovascular:      Rate and Rhythm: Normal rate and regular rhythm.      Heart sounds: Normal heart sounds. No murmur heard.  Pulmonary:      Breath sounds: Normal breath sounds. No wheezing, rhonchi or rales.   Abdominal:      General: Bowel sounds are normal.   Musculoskeletal:         General: No swelling.      Cervical back: Normal range of motion and  neck supple.   Skin:     General: Skin is warm and dry.   Neurological:      Mental Status: She is alert and oriented to person, place, and time.        Lab Results   Component Value Date    WBC 4.09 (L) 01/18/2023    HGB 14.0 01/18/2023    HCT 43.2 01/18/2023    MCV 84.9 01/18/2023     01/18/2023        Sodium   Date Value Ref Range Status   01/18/2023 142 136 - 145 mmol/L Final     Potassium   Date Value Ref Range Status   01/18/2023 3.5 3.5 - 5.1 mmol/L Final     Chloride   Date Value Ref Range Status   01/18/2023 103 98 - 107 mmol/L Final     CO2   Date Value Ref Range Status   01/18/2023 31 21 - 32 mmol/L Final     Glucose   Date Value Ref Range Status   01/18/2023 101 74 - 106 mg/dL Final     BUN   Date Value Ref Range Status   01/18/2023 12 7 - 18 mg/dL Final     Creatinine   Date Value Ref Range Status   01/18/2023 1.04 (H) 0.55 - 1.02 mg/dL Final     Calcium   Date Value Ref Range Status   01/18/2023 9.8 8.5 - 10.1 mg/dL Final     Total Protein   Date Value Ref Range Status   01/18/2023 8.1 6.4 - 8.2 g/dL Final     Albumin   Date Value Ref Range Status   01/18/2023 3.9 3.5 - 5.0 g/dL Final     Bilirubin, Total   Date Value Ref Range Status   01/18/2023 0.5 >0.0 - 1.2 mg/dL Final     Alk Phos   Date Value Ref Range Status   01/18/2023 92 46 - 118 U/L Final     AST   Date Value Ref Range Status   01/18/2023 21 15 - 37 U/L Final     ALT   Date Value Ref Range Status   01/18/2023 23 13 - 56 U/L Final     Anion Gap   Date Value Ref Range Status   01/18/2023 12 7 - 16 mmol/L Final     eGFR   Date Value Ref Range Status   01/18/2023 64 >=60 mL/min/1.73m² Final      Lab Results   Component Value Date    HGBA1C 5.4 07/21/2022      Lab Results   Component Value Date    CHOL 170 01/18/2023    CHOL 157 06/01/2021     Lab Results   Component Value Date    HDL 40 01/18/2023    HDL 38 (L) 06/01/2021     Lab Results   Component Value Date    LDLCALC 101 01/18/2023    LDLCALC 100 06/01/2021     No results found for:  DLDL  Lab Results   Component Value Date    TRIG 147 01/18/2023    TRIG 94 06/01/2021     Lab Results   Component Value Date    CHOLHDL 4.3 01/18/2023    CHOLHDL 4.1 06/01/2021      No results found for: TSH, G8NGOGT, A5UKNKO, THYROIDAB, FREET4     Assessment and Plan (including Health Maintenance)      Problem List  Smart Sets  Document Outside HM   :    Plan:     1. Benign hypertension  Overview:  bp appears well controlled today  cont home meds    Assessment & Plan:  BP Readings from Last 3 Encounters:   07/17/23 (!) 144/89   02/28/23 120/78   01/18/23 (!) 130/92     Cont home meds  Increase hydration     Orders:  -     Comprehensive Metabolic Panel; Future; Expected date: 07/17/2023  -     Lipid Panel; Future; Expected date: 07/17/2023  -     Microalbumin/Creatinine Ratio, Urine; Future; Expected date: 07/17/2023  -     hydroCHLOROthiazide (HYDRODIURIL) 25 MG tablet; Take 1 tablet (25 mg total) by mouth once daily.  Dispense: 90 tablet; Refill: 1    2. IFG (impaired fasting glucose)  -     Hemoglobin A1C; Future; Expected date: 07/17/2023    Other orders  -     cetirizine (ZYRTEC) 10 MG tablet; Take 1 tablet (10 mg total) by mouth every other day.  Dispense: 90 tablet; Refill: 1         There are no Patient Instructions on file for this visit.     Health Maintenance Due   Topic Date Due    Diabetes Urine Screening  07/21/2023    Hemoglobin A1c  07/21/2023         Health Maintenance Topics with due status: Not Due       Topic Last Completion Date    Influenza Vaccine 01/19/2018    Colorectal Cancer Screening 02/23/2018    Lipid Panel 01/18/2023       Future Appointments   Date Time Provider Department Center   1/17/2024  3:00 PM IZZY Garcia [facility] [place]            Signature:  IZZY Garcia  [facility] [address] [place] [address]  [phone]    Date of encounter: 7/17/23

## 2023-07-17 NOTE — ASSESSMENT & PLAN NOTE
BP Readings from Last 3 Encounters:   07/17/23 (!) 144/89   02/28/23 120/78   01/18/23 (!) 130/92     Cont home meds  Increase hydration

## 2023-08-22 ENCOUNTER — PATIENT OUTREACH (OUTPATIENT)
Dept: ADMINISTRATIVE | Facility: HOSPITAL | Age: 58
End: 2023-08-22

## 2023-08-22 LAB — BCS RECOMMENDATION EXT: NORMAL

## 2023-08-22 NOTE — PROGRESS NOTES
08/22/2023   --Chart accessed for: GAP REPORT  --Care Gaps addressed: HTN  Outreach made to patient via TELEPHONE--LEFT MESSAGE  Care Everywhere updates requested and reviewed.  Media reports reviewed.  [facility] and [facility] reviewed.  Immunization Database ([employer]/MIXX) reviewed. Vaccinations uploaded: NONE  HAC abstracted.  Next appointment 01/17/2024 . Appointment notes updated to include: LAST BP NONCOMPLIANT

## 2023-10-12 ENCOUNTER — PATIENT OUTREACH (OUTPATIENT)
Dept: FAMILY MEDICINE | Facility: CLINIC | Age: 58
End: 2023-10-12
Payer: OTHER GOVERNMENT

## 2023-12-06 ENCOUNTER — PATIENT MESSAGE (OUTPATIENT)
Dept: ADMINISTRATIVE | Facility: HOSPITAL | Age: 58
End: 2023-12-06

## 2024-01-17 ENCOUNTER — OFFICE VISIT (OUTPATIENT)
Dept: FAMILY MEDICINE | Facility: CLINIC | Age: 59
End: 2024-01-17
Payer: OTHER GOVERNMENT

## 2024-01-17 VITALS
BODY MASS INDEX: 32.44 KG/M2 | DIASTOLIC BLOOD PRESSURE: 87 MMHG | HEIGHT: 69 IN | OXYGEN SATURATION: 96 % | HEART RATE: 70 BPM | WEIGHT: 219 LBS | TEMPERATURE: 99 F | RESPIRATION RATE: 16 BRPM | SYSTOLIC BLOOD PRESSURE: 124 MMHG

## 2024-01-17 DIAGNOSIS — I10 BENIGN HYPERTENSION: Primary | ICD-10-CM

## 2024-01-17 LAB
ALBUMIN SERPL BCP-MCNC: 3.7 G/DL (ref 3.5–5)
ALBUMIN/GLOB SERPL: 0.7 {RATIO}
ALP SERPL-CCNC: 90 U/L (ref 46–118)
ALT SERPL W P-5'-P-CCNC: 24 U/L (ref 13–56)
ANION GAP SERPL CALCULATED.3IONS-SCNC: 6 MMOL/L (ref 7–16)
AST SERPL W P-5'-P-CCNC: 16 U/L (ref 15–37)
BASOPHILS # BLD AUTO: 0.03 K/UL (ref 0–0.2)
BASOPHILS NFR BLD AUTO: 0.7 % (ref 0–1)
BILIRUB SERPL-MCNC: 0.5 MG/DL (ref ?–1.2)
BUN SERPL-MCNC: 15 MG/DL (ref 7–18)
BUN/CREAT SERPL: 14 (ref 6–20)
CALCIUM SERPL-MCNC: 10 MG/DL (ref 8.5–10.1)
CHLORIDE SERPL-SCNC: 104 MMOL/L (ref 98–107)
CHOLEST SERPL-MCNC: 188 MG/DL (ref 0–200)
CHOLEST/HDLC SERPL: 4.8 {RATIO}
CO2 SERPL-SCNC: 32 MMOL/L (ref 21–32)
CREAT SERPL-MCNC: 1.07 MG/DL (ref 0.55–1.02)
DIFFERENTIAL METHOD BLD: ABNORMAL
EGFR (NO RACE VARIABLE) (RUSH/TITUS): 61 ML/MIN/1.73M2
EOSINOPHIL # BLD AUTO: 0.13 K/UL (ref 0–0.5)
EOSINOPHIL NFR BLD AUTO: 2.9 % (ref 1–4)
ERYTHROCYTE [DISTWIDTH] IN BLOOD BY AUTOMATED COUNT: 14.1 % (ref 11.5–14.5)
GLOBULIN SER-MCNC: 5 G/DL (ref 2–4)
GLUCOSE SERPL-MCNC: 93 MG/DL (ref 74–106)
HCT VFR BLD AUTO: 41.7 % (ref 38–47)
HDLC SERPL-MCNC: 39 MG/DL (ref 40–60)
HGB BLD-MCNC: 13.6 G/DL (ref 12–16)
IMM GRANULOCYTES # BLD AUTO: 0.01 K/UL (ref 0–0.04)
IMM GRANULOCYTES NFR BLD: 0.2 % (ref 0–0.4)
LDLC SERPL CALC-MCNC: 121 MG/DL
LDLC/HDLC SERPL: 3.1 {RATIO}
LYMPHOCYTES # BLD AUTO: 1.69 K/UL (ref 1–4.8)
LYMPHOCYTES NFR BLD AUTO: 37.7 % (ref 27–41)
MCH RBC QN AUTO: 27.3 PG (ref 27–31)
MCHC RBC AUTO-ENTMCNC: 32.6 G/DL (ref 32–36)
MCV RBC AUTO: 83.7 FL (ref 80–96)
MONOCYTES # BLD AUTO: 0.49 K/UL (ref 0–0.8)
MONOCYTES NFR BLD AUTO: 10.9 % (ref 2–6)
MPC BLD CALC-MCNC: 10.2 FL (ref 9.4–12.4)
NEUTROPHILS # BLD AUTO: 2.13 K/UL (ref 1.8–7.7)
NEUTROPHILS NFR BLD AUTO: 47.6 % (ref 53–65)
NONHDLC SERPL-MCNC: 149 MG/DL
NRBC # BLD AUTO: 0 X10E3/UL
NRBC, AUTO (.00): 0 %
PLATELET # BLD AUTO: 264 K/UL (ref 150–400)
POTASSIUM SERPL-SCNC: 3.6 MMOL/L (ref 3.5–5.1)
PROT SERPL-MCNC: 8.7 G/DL (ref 6.4–8.2)
RBC # BLD AUTO: 4.98 M/UL (ref 4.2–5.4)
SODIUM SERPL-SCNC: 138 MMOL/L (ref 136–145)
TRIGL SERPL-MCNC: 138 MG/DL (ref 35–150)
VLDLC SERPL-MCNC: 28 MG/DL
WBC # BLD AUTO: 4.48 K/UL (ref 4.5–11)

## 2024-01-17 PROCEDURE — 80061 LIPID PANEL: CPT | Mod: ,,, | Performed by: CLINICAL MEDICAL LABORATORY

## 2024-01-17 PROCEDURE — 85025 COMPLETE CBC W/AUTO DIFF WBC: CPT | Mod: ,,, | Performed by: CLINICAL MEDICAL LABORATORY

## 2024-01-17 PROCEDURE — 99213 OFFICE O/P EST LOW 20 MIN: CPT | Mod: ,,, | Performed by: NURSE PRACTITIONER

## 2024-01-17 PROCEDURE — 80053 COMPREHEN METABOLIC PANEL: CPT | Mod: ,,, | Performed by: CLINICAL MEDICAL LABORATORY

## 2024-01-17 RX ORDER — HYDROCHLOROTHIAZIDE 25 MG/1
25 TABLET ORAL DAILY
Qty: 90 TABLET | Refills: 1 | Status: SHIPPED | OUTPATIENT
Start: 2024-01-17

## 2024-01-17 NOTE — PROGRESS NOTES
IZZY Garcia   [facility] [address] [place] [address]  [phone]      PATIENT NAME: Isidra Mcintosh  : 1965  DATE: 24  MRN: [id number]      Billing Provider: IZZY Garcia  Level of Service:   Patient PCP Information       Provider PCP Type    IZZY Garcia General            Reason for Visit / Chief Complaint: Follow-up and Hypertension       Update PCP  Update Chief Complaint         History of Present Illness / Problem Focused Workflow     Isidra Mcintosh presents to the clinic with Follow-up and Hypertension     Pt presents for routine follow up with lab and med refills. Overall doing well.      BP appears  controlled today. Home meds reviewed  The current medical regimen is effective;  continue present plan and medications. Recommended patient to check home readings to monitor and see me for followup as scheduled or sooner as needed.   Discussed sodium restriction, maintaining ideal body weight and regular exercise program as physiologic means to continue to achieve blood pressure control in addition to medication compliance.  Patient was educated that both decongestant and anti-inflammatory medication may raise blood pressure.  Advised to monitor BP at home. Advised on optimal BP readings - SBP < 130 & DBP < 80. Advised to call office for any persistent BP elevation and may have to prescribe or adjust BP med(s).  Recommended DASH diet, stay well hydrated with water daily, eliminate or decrease caffeinated and high calorie drinks, increase physical activity, and lose weight if BMI > 25.0.    Discussed need for low fat/low cholesterol diet, regular exercise, and weight control.   Cardiovascular risk and specific lipid/LDL goals reviewed.        Review of Systems     Review of Systems   Constitutional:  Negative for fatigue and fever.   HENT:  Negative for nasal  congestion and sore throat.    Eyes:  Negative for visual disturbance.   Respiratory:  Negative for chest tightness and shortness of breath.    Cardiovascular:  Negative for chest pain and leg swelling.   Gastrointestinal:  Negative for abdominal pain and change in bowel habit.   Endocrine: Negative for polydipsia, polyphagia and polyuria.   Genitourinary:  Negative for dysuria and hematuria.   Musculoskeletal:  Negative for back pain and leg pain.   Integumentary:  Negative for rash.   Neurological:  Negative for dizziness, syncope, weakness and light-headedness.        Medical / Social / Family History     Past Medical History:   Diagnosis Date    Essential hypertension, malignant     Vitamin D deficiency        Past Surgical History:   Procedure Laterality Date    BREAST SURGERY       SECTION      HYSTERECTOMY         Social History  Ms. Mcintosh  reports that she has never smoked. She has never been exposed to tobacco smoke. She has never used smokeless tobacco. She reports that she does not drink alcohol and does not use drugs.    Family History  Ms. Mcintosh's family history includes COPD in her father; Diabetes in her father; Heart disease in her maternal grandmother and maternal uncle; Hypertension in her father and mother; Kidney disease in her father.    Medications and Allergies     Medications  Outpatient Medications Marked as Taking for the 24 encounter (Office Visit) with Katie Frazier ACNP   Medication Sig Dispense Refill    cetirizine (ZYRTEC) 10 MG tablet Take 1 tablet (10 mg total) by mouth every other day. 90 tablet 1    estradioL (ESTRACE) 0.01 % (0.1 mg/gram) vaginal cream Place 0.5 g vaginally 3 (three) times a week. 42.5 g 1    fluticasone propionate (FLONASE) 50 mcg/actuation nasal spray SMARTSI-2 Spray(s) Both Nares Daily PRN 16 g 3    multivitamin (THERAGRAN) per tablet Take 1 tablet by mouth once daily. 90 tablet 1    [DISCONTINUED] hydroCHLOROthiazide (HYDRODIURIL)  25 MG tablet Take 1 tablet (25 mg total) by mouth once daily. 90 tablet 1       Allergies  Review of patient's allergies indicates:  No Known Allergies    Physical Examination     Vitals:    01/17/24 0914   BP: 124/87   Pulse: 70   Resp: 16   Temp: 98.5 °F (36.9 °C)     Physical Exam  Eyes:      Pupils: Pupils are equal, round, and reactive to light.   Cardiovascular:      Rate and Rhythm: Normal rate and regular rhythm.      Heart sounds: Normal heart sounds. No murmur heard.  Pulmonary:      Breath sounds: Normal breath sounds. No wheezing, rhonchi or rales.   Abdominal:      General: Bowel sounds are normal.   Musculoskeletal:         General: No swelling.      Cervical back: Normal range of motion and neck supple.   Skin:     General: Skin is warm and dry.   Neurological:      Mental Status: She is alert and oriented to person, place, and time.          Lab Results   Component Value Date    WBC 4.09 (L) 01/18/2023    HGB 14.0 01/18/2023    HCT 43.2 01/18/2023    MCV 84.9 01/18/2023     01/18/2023        Sodium   Date Value Ref Range Status   07/17/2023 139 136 - 145 mmol/L Final     Potassium   Date Value Ref Range Status   07/17/2023 3.5 3.5 - 5.1 mmol/L Final     Chloride   Date Value Ref Range Status   07/17/2023 104 98 - 107 mmol/L Final     CO2   Date Value Ref Range Status   07/17/2023 29 21 - 32 mmol/L Final     Glucose   Date Value Ref Range Status   07/17/2023 92 74 - 106 mg/dL Final     BUN   Date Value Ref Range Status   07/17/2023 17 7 - 18 mg/dL Final     Creatinine   Date Value Ref Range Status   07/17/2023 1.05 (H) 0.55 - 1.02 mg/dL Final     Calcium   Date Value Ref Range Status   07/17/2023 9.8 8.5 - 10.1 mg/dL Final     Total Protein   Date Value Ref Range Status   07/17/2023 7.9 6.4 - 8.2 g/dL Final     Albumin   Date Value Ref Range Status   07/17/2023 3.5 3.5 - 5.0 g/dL Final     Bilirubin, Total   Date Value Ref Range Status   07/17/2023 0.3 >0.0 - 1.2 mg/dL Final     Alk Phos   Date  "Value Ref Range Status   07/17/2023 86 46 - 118 U/L Final     AST   Date Value Ref Range Status   07/17/2023 21 15 - 37 U/L Final     ALT   Date Value Ref Range Status   07/17/2023 25 13 - 56 U/L Final     Anion Gap   Date Value Ref Range Status   07/17/2023 10 7 - 16 mmol/L Final     eGFR   Date Value Ref Range Status   07/17/2023 62 >=60 mL/min/1.73m2 Final      Lab Results   Component Value Date    HGBA1C 5.5 07/17/2023      Lab Results   Component Value Date    CHOL 176 07/17/2023    CHOL 170 01/18/2023    CHOL 157 06/01/2021     Lab Results   Component Value Date    HDL 37 (L) 07/17/2023    HDL 40 01/18/2023    HDL 38 (L) 06/01/2021     Lab Results   Component Value Date    LDLCALC 107 07/17/2023    LDLCALC 101 01/18/2023    LDLCALC 100 06/01/2021     No results found for: "DLDL"  Lab Results   Component Value Date    TRIG 162 (H) 07/17/2023    TRIG 147 01/18/2023    TRIG 94 06/01/2021     Lab Results   Component Value Date    CHOLHDL 4.8 07/17/2023    CHOLHDL 4.3 01/18/2023    CHOLHDL 4.1 06/01/2021      No results found for: "TSH", "C3QNSTD", "B7HPMUP", "THYROIDAB", "FREET4"     Assessment and Plan (including Health Maintenance)      Problem List  Smart Sets  Document Outside HM   :    Plan:     1. Benign hypertension  Overview:  bp appears well controlled today  cont home meds    Assessment & Plan:  BP Readings from Last 3 Encounters:   01/17/24 124/87   07/17/23 (!) 144/89   02/28/23 120/78    The current medical regimen is effective;  continue present plan and medications.      Orders:  -     CBC Auto Differential; Future; Expected date: 01/17/2024  -     Comprehensive Metabolic Panel; Future; Expected date: 01/17/2024  -     Lipid Panel; Future; Expected date: 01/17/2024  -     hydroCHLOROthiazide (HYDRODIURIL) 25 MG tablet; Take 1 tablet (25 mg total) by mouth once daily.  Dispense: 90 tablet; Refill: 1         There are no Patient Instructions on file for this visit.     There are no preventive care " reminders to display for this patient.      Health Maintenance Topics with due status: Not Due       Topic Last Completion Date    Colorectal Cancer Screening 02/23/2018    Diabetes Urine Screening 07/17/2023    Lipid Panel 07/17/2023    Hemoglobin A1c 07/17/2023    Mammogram 08/22/2023       Future Appointments   Date Time Provider Department Center   7/17/2024  8:20 AM Katie Frazier ACNP [facility]            Signature:  IZZY Garcia [facility] [address] [place] [address]  [phone]    Date of encounter: 1/17/24

## 2024-01-17 NOTE — ASSESSMENT & PLAN NOTE
BP Readings from Last 3 Encounters:   01/17/24 124/87   07/17/23 (!) 144/89   02/28/23 120/78    The current medical regimen is effective;  continue present plan and medications.

## 2024-01-30 ENCOUNTER — PATIENT MESSAGE (OUTPATIENT)
Dept: OBSTETRICS AND GYNECOLOGY | Facility: CLINIC | Age: 59
End: 2024-01-30
Payer: OTHER GOVERNMENT

## 2024-02-10 ENCOUNTER — PATIENT MESSAGE (OUTPATIENT)
Dept: OBSTETRICS AND GYNECOLOGY | Facility: CLINIC | Age: 59
End: 2024-02-10
Payer: OTHER GOVERNMENT

## 2024-04-09 ENCOUNTER — OFFICE VISIT (OUTPATIENT)
Dept: FAMILY MEDICINE | Facility: CLINIC | Age: 59
End: 2024-04-09
Payer: OTHER GOVERNMENT

## 2024-04-09 ENCOUNTER — PATIENT MESSAGE (OUTPATIENT)
Dept: FAMILY MEDICINE | Facility: CLINIC | Age: 59
End: 2024-04-09
Payer: OTHER GOVERNMENT

## 2024-04-09 VITALS
TEMPERATURE: 98 F | RESPIRATION RATE: 16 BRPM | BODY MASS INDEX: 32.14 KG/M2 | HEART RATE: 57 BPM | WEIGHT: 217 LBS | DIASTOLIC BLOOD PRESSURE: 88 MMHG | OXYGEN SATURATION: 99 % | SYSTOLIC BLOOD PRESSURE: 135 MMHG | HEIGHT: 69 IN

## 2024-04-09 DIAGNOSIS — M54.41 ACUTE BILATERAL LOW BACK PAIN WITH RIGHT-SIDED SCIATICA: Primary | ICD-10-CM

## 2024-04-09 DIAGNOSIS — R10.9 FLANK PAIN: ICD-10-CM

## 2024-04-09 DIAGNOSIS — N39.0 URINARY TRACT INFECTION WITH HEMATURIA, SITE UNSPECIFIED: ICD-10-CM

## 2024-04-09 DIAGNOSIS — R31.9 URINARY TRACT INFECTION WITH HEMATURIA, SITE UNSPECIFIED: ICD-10-CM

## 2024-04-09 LAB
BILIRUB SERPL-MCNC: ABNORMAL MG/DL
BLOOD URINE, POC: ABNORMAL
COLOR, POC UA: YELLOW
GLUCOSE UR QL STRIP: ABNORMAL
KETONES UR QL STRIP: ABNORMAL
LEUKOCYTE ESTERASE URINE, POC: ABNORMAL
NITRITE, POC UA: ABNORMAL
PH, POC UA: 6
PROTEIN, POC: ABNORMAL
SPECIFIC GRAVITY, POC UA: 1.03
UROBILINOGEN, POC UA: 0.2

## 2024-04-09 PROCEDURE — 87086 URINE CULTURE/COLONY COUNT: CPT | Mod: ,,, | Performed by: CLINICAL MEDICAL LABORATORY

## 2024-04-09 PROCEDURE — 81003 URINALYSIS AUTO W/O SCOPE: CPT | Mod: QW,,, | Performed by: NURSE PRACTITIONER

## 2024-04-09 PROCEDURE — 99214 OFFICE O/P EST MOD 30 MIN: CPT | Mod: ,,, | Performed by: NURSE PRACTITIONER

## 2024-04-09 RX ORDER — METHOCARBAMOL 500 MG/1
500 TABLET, FILM COATED ORAL 4 TIMES DAILY PRN
Qty: 40 TABLET | Refills: 0 | Status: SHIPPED | OUTPATIENT
Start: 2024-04-09 | End: 2024-04-19

## 2024-04-09 RX ORDER — CYCLOBENZAPRINE HCL 5 MG
5 TABLET ORAL 3 TIMES DAILY PRN
Qty: 30 TABLET | Refills: 0 | Status: SHIPPED | OUTPATIENT
Start: 2024-04-09 | End: 2024-04-09 | Stop reason: SINTOL

## 2024-04-09 NOTE — PROGRESS NOTES
IZZY Garcia   [facility] [address] [place] [address]  [phone]      PATIENT NAME: Isidra Mcintosh  : 1965  DATE: 24  MRN: [id number]      Billing Provider: IZZY Garcia  Level of Service:   Patient PCP Information       Provider PCP Type    IZZY Garcia General            Reason for Visit / Chief Complaint: Back Pain       Update PCP  Update Chief Complaint         History of Present Illness / Problem Focused Workflow     Isidra Mcintosh presents to the clinic with Back Pain     Patient presents complaints lower back pain her lateral hips and down right leg.  Patient reports that she sits for long periods time of her jaw putting pressure on hips and back.  She has been seeing a chiropractor and has not had any improvement.  This worsening pain began a proximally 2 months ago even after seeing her chiropractor she has continued to have comfort pain.  She denies any difficulty with bowel or bladder control.      She does have some difficulty going from sitting to standing.  We will obtain a urinalysis today and send for culture to rule out any possible urinary tract infection that be causing flank pain would radiate into her hip and legs.  Also recommend x-rays the lumbar spine and bilateral hips.  We do not have x-ray available in clinic today we will replace in the epic system.  Patient will go to the [facility] hospital for x-rays at her convenience tomorrow.    We will treat with some low-dose Robaxin to help with any inflammation discussed the possibility of physical therapy versus referral for further evaluation and treatment.  Patient verbalized understanding and agreement    Back Pain  This is a chronic problem. The current episode started more than 1 year ago. The problem occurs constantly. The problem has been gradually worsening since onset. The pain is present in  the gluteal, lumbar spine, sacro-iliac and thoracic spine. The quality of the pain is described as aching and burning. The pain is at a severity of 7/10. The pain is moderate. The pain is The same all the time. The symptoms are aggravated by sitting.       Review of Systems     Review of Systems   Musculoskeletal:  Positive for back pain.        Medical / Social / Family History     Past Medical History:   Diagnosis Date    Essential hypertension, malignant     Vitamin D deficiency        Past Surgical History:   Procedure Laterality Date    BREAST SURGERY       SECTION      HYSTERECTOMY         Social History  Ms. Mcintosh  reports that she has never smoked. She has never been exposed to tobacco smoke. She has never used smokeless tobacco. She reports that she does not drink alcohol and does not use drugs.    Family History  Ms. cMintosh's family history includes COPD in her father; Diabetes in her father; Heart disease in her maternal grandmother and maternal uncle; Hypertension in her father and mother; Kidney disease in her father.    Medications and Allergies     Medications  Outpatient Medications Marked as Taking for the 24 encounter (Office Visit) with Katie Frazier ACNP   Medication Sig Dispense Refill    cetirizine (ZYRTEC) 10 MG tablet Take 1 tablet (10 mg total) by mouth every other day. 90 tablet 1    fluticasone propionate (FLONASE) 50 mcg/actuation nasal spray SMARTSI-2 Spray(s) Both Nares Daily PRN 16 g 3    hydroCHLOROthiazide (HYDRODIURIL) 25 MG tablet Take 1 tablet (25 mg total) by mouth once daily. 90 tablet 1    multivitamin (THERAGRAN) per tablet Take 1 tablet by mouth once daily. 90 tablet 1       Allergies  Review of patient's allergies indicates:  No Known Allergies    Physical Examination     Vitals:    24 0839   BP: 135/88   Pulse: (!) 57   Resp: 16   Temp: 98.2 °F (36.8 °C)     Physical Exam  Eyes:      Pupils: Pupils are equal, round, and reactive to light.    Cardiovascular:      Rate and Rhythm: Normal rate and regular rhythm.      Heart sounds: Normal heart sounds. No murmur heard.  Pulmonary:      Breath sounds: Normal breath sounds. No wheezing, rhonchi or rales.   Abdominal:      General: Bowel sounds are normal.      Palpations: Abdomen is soft.   Musculoskeletal:         General: No swelling.      Cervical back: Normal range of motion and neck supple.   Skin:     General: Skin is warm and dry.   Neurological:      Mental Status: She is alert and oriented to person, place, and time.          Lab Results   Component Value Date    WBC 4.48 (L) 01/17/2024    HGB 13.6 01/17/2024    HCT 41.7 01/17/2024    MCV 83.7 01/17/2024     01/17/2024        Sodium   Date Value Ref Range Status   01/17/2024 138 136 - 145 mmol/L Final     Potassium   Date Value Ref Range Status   01/17/2024 3.6 3.5 - 5.1 mmol/L Final     Chloride   Date Value Ref Range Status   01/17/2024 104 98 - 107 mmol/L Final     CO2   Date Value Ref Range Status   01/17/2024 32 21 - 32 mmol/L Final     Glucose   Date Value Ref Range Status   01/17/2024 93 74 - 106 mg/dL Final     BUN   Date Value Ref Range Status   01/17/2024 15 7 - 18 mg/dL Final     Creatinine   Date Value Ref Range Status   01/17/2024 1.07 (H) 0.55 - 1.02 mg/dL Final     Calcium   Date Value Ref Range Status   01/17/2024 10.0 8.5 - 10.1 mg/dL Final     Total Protein   Date Value Ref Range Status   01/17/2024 8.7 (H) 6.4 - 8.2 g/dL Final     Albumin   Date Value Ref Range Status   01/17/2024 3.7 3.5 - 5.0 g/dL Final     Bilirubin, Total   Date Value Ref Range Status   01/17/2024 0.5 >0.0 - 1.2 mg/dL Final     Alk Phos   Date Value Ref Range Status   01/17/2024 90 46 - 118 U/L Final     AST   Date Value Ref Range Status   01/17/2024 16 15 - 37 U/L Final     ALT   Date Value Ref Range Status   01/17/2024 24 13 - 56 U/L Final     Anion Gap   Date Value Ref Range Status   01/17/2024 6 (L) 7 - 16 mmol/L Final     eGFR   Date Value Ref  "Range Status   01/17/2024 61 >=60 mL/min/1.73m2 Final      Lab Results   Component Value Date    HGBA1C 5.5 07/17/2023      Lab Results   Component Value Date    CHOL 188 01/17/2024    CHOL 176 07/17/2023    CHOL 170 01/18/2023     Lab Results   Component Value Date    HDL 39 (L) 01/17/2024    HDL 37 (L) 07/17/2023    HDL 40 01/18/2023     Lab Results   Component Value Date    LDLCALC 121 01/17/2024    LDLCALC 107 07/17/2023    LDLCALC 101 01/18/2023     No results found for: "DLDL"  Lab Results   Component Value Date    TRIG 138 01/17/2024    TRIG 162 (H) 07/17/2023    TRIG 147 01/18/2023     Lab Results   Component Value Date    CHOLHDL 4.8 01/17/2024    CHOLHDL 4.8 07/17/2023    CHOLHDL 4.3 01/18/2023      No results found for: "TSH", "N2PWWQU", "V4UEMDL", "THYROIDAB", "FREET4"     Assessment and Plan (including Health Maintenance)      Problem List  Smart Sets  Document Outside HM   :    Plan:     1. Acute bilateral low back pain with right-sided sciatica  Comments:  xrays on lumbar spine and hips  Orders:  -     X-Ray Lumbar Spine AP And Lateral; Future; Expected date: 04/09/2024  -     X-Ray Hip 3 or 4 views Bilateral; Future; Expected date: 04/09/2024  -     Discontinue: cyclobenzaprine (FLEXERIL) 5 MG tablet; Take 1 tablet (5 mg total) by mouth 3 (three) times daily as needed for Muscle spasms.  Dispense: 30 tablet; Refill: 0  -     methocarbamoL (ROBAXIN) 500 MG Tab; Take 1 tablet (500 mg total) by mouth 4 (four) times daily as needed (back pain).  Dispense: 40 tablet; Refill: 0    2. Urinary tract infection with hematuria, site unspecified  Comments:  send for culture  Orders:  -     Urine culture    3. Flank pain  -     POCT URINALYSIS W/O SCOPE         There are no Patient Instructions on file for this visit.     There are no preventive care reminders to display for this patient.      Health Maintenance Topics with due status: Not Due       Topic Last Completion Date    Colorectal Cancer Screening " 02/23/2018    Diabetes Urine Screening 07/17/2023    Hemoglobin A1c 07/17/2023    Mammogram 08/22/2023    Lipid Panel 01/17/2024       Future Appointments   Date Time Provider Department Center   4/16/2024 11:00 AM Nathanael Chatterjee MD [facility] OBGYN [facility]   7/17/2024  8:20 AM Katie Frazier ACNP [facility]            Signature:  [facility] [address] [place] [address]  [phone]    Date of encounter: 4/9/24    Answers submitted by the patient for this visit:  Back Pain Questionnaire (Submitted on 4/8/2024)  Chief Complaint: Back pain

## 2024-04-11 ENCOUNTER — HOSPITAL ENCOUNTER (OUTPATIENT)
Dept: RADIOLOGY | Facility: HOSPITAL | Age: 59
Discharge: HOME | End: 2024-04-11
Attending: NURSE PRACTITIONER
Payer: OTHER GOVERNMENT

## 2024-04-11 DIAGNOSIS — M54.41 ACUTE BILATERAL LOW BACK PAIN WITH RIGHT-SIDED SCIATICA: ICD-10-CM

## 2024-04-11 PROCEDURE — 72100 X-RAY EXAM L-S SPINE 2/3 VWS: CPT | Mod: 26,,, | Performed by: STUDENT IN AN ORGANIZED HEALTH CARE EDUCATION/TRAINING PROGRAM

## 2024-04-11 PROCEDURE — 73522 X-RAY EXAM HIPS BI 3-4 VIEWS: CPT | Mod: 26,,, | Performed by: STUDENT IN AN ORGANIZED HEALTH CARE EDUCATION/TRAINING PROGRAM

## 2024-04-11 PROCEDURE — 72100 X-RAY EXAM L-S SPINE 2/3 VWS: CPT | Mod: TC

## 2024-04-11 PROCEDURE — 73522 X-RAY EXAM HIPS BI 3-4 VIEWS: CPT | Mod: TC

## 2024-04-13 LAB — UA COMPLETE W REFLEX CULTURE PNL UR: NO GROWTH

## 2024-04-16 ENCOUNTER — PROCEDURE VISIT (OUTPATIENT)
Dept: OBSTETRICS AND GYNECOLOGY | Facility: CLINIC | Age: 59
End: 2024-04-16
Payer: OTHER GOVERNMENT

## 2024-04-16 VITALS
HEART RATE: 65 BPM | SYSTOLIC BLOOD PRESSURE: 131 MMHG | WEIGHT: 218.19 LBS | DIASTOLIC BLOOD PRESSURE: 85 MMHG | BODY MASS INDEX: 32.22 KG/M2

## 2024-04-16 DIAGNOSIS — R10.2 PELVIC PAIN: Primary | ICD-10-CM

## 2024-04-16 DIAGNOSIS — M54.9 BACK PAIN, UNSPECIFIED BACK LOCATION, UNSPECIFIED BACK PAIN LATERALITY, UNSPECIFIED CHRONICITY: ICD-10-CM

## 2024-04-16 DIAGNOSIS — N36.8 URETHRAL PROLAPSE: ICD-10-CM

## 2024-04-16 DIAGNOSIS — R10.2 PELVIC PAIN: ICD-10-CM

## 2024-04-16 DIAGNOSIS — N95.2 VAGINAL ATROPHY: Primary | ICD-10-CM

## 2024-04-16 DIAGNOSIS — M25.59 PAIN IN OTHER JOINT: ICD-10-CM

## 2024-04-16 PROCEDURE — 76856 US EXAM PELVIC COMPLETE: CPT | Mod: ,,, | Performed by: OBSTETRICS & GYNECOLOGY

## 2024-04-16 PROCEDURE — 99499 UNLISTED E&M SERVICE: CPT | Mod: ,,, | Performed by: OBSTETRICS & GYNECOLOGY

## 2024-04-16 PROCEDURE — 99214 OFFICE O/P EST MOD 30 MIN: CPT | Mod: ,,, | Performed by: OBSTETRICS & GYNECOLOGY

## 2024-04-16 RX ORDER — ERGOCALCIFEROL 1.25 MG/1
50000 CAPSULE ORAL
Qty: 4 CAPSULE | Refills: 11 | Status: SHIPPED | OUTPATIENT
Start: 2024-04-16 | End: 2025-04-16

## 2024-04-16 RX ORDER — ESTRADIOL 0.1 MG/G
0.5 CREAM VAGINAL
Qty: 42.5 G | Refills: 1 | Status: SHIPPED | OUTPATIENT
Start: 2024-04-17 | End: 2025-04-17

## 2024-04-16 NOTE — PROGRESS NOTES
Subjective     Patient ID: Isidra Mcintosh is a 59 y.o. female.    Chief Complaint:  Follow-up      History of Present Illness  Pt presents for f/u from 2/2023 where she was prescribed Estrogen cream for atrophy and urethral prolapse. Pt states that she had vaginal spotting last year after feeling a tearing in her vagina while in the tub. She has not had any other problems since then. She was seen by her primary care provider and instructed to f/u with her Gynecologist for hormone therapy because she is having pain in her back, buttocks and legs. Pt denies hot flashes or night sweats. She has had a hysterectomy, but states that she still has her ovaries.           GYN & OB History  No LMP recorded. Patient has had a hysterectomy.   Date of Last Pap: No result found    OB History   No obstetric history on file.       Review of Systems  Review of Systems   Constitutional:  Negative for activity change, appetite change, fatigue and unexpected weight change.   HENT: Negative.     Respiratory:  Negative for cough, shortness of breath and wheezing.    Cardiovascular:  Negative for chest pain, palpitations and leg swelling.   Gastrointestinal:  Negative for abdominal pain, bloating, blood in stool, constipation, diarrhea, nausea, vomiting and reflux.   Genitourinary:  Negative for bladder incontinence, decreased libido, dysmenorrhea, dyspareunia, dysuria, flank pain, frequency, hot flashes, menorrhagia, menstrual problem, pelvic pain, urgency, vaginal bleeding, vaginal discharge, urinary incontinence, postcoital bleeding, postmenopausal bleeding, vaginal dryness and vaginal odor.   Musculoskeletal:  Positive for arthralgias. Negative for back pain.   Integumentary:  Negative for rash, acne, hair changes, mole/lesion, breast mass, nipple discharge, breast skin changes and breast tenderness.   Neurological:  Negative for headaches.   Psychiatric/Behavioral:  Negative for depression and sleep disturbance. The patient is not  nervous/anxious.    Breast: Negative for asymmetry, breast self exam, lump, mass, nipple discharge, skin changes and tenderness         Objective   Physical Exam:   Constitutional: She is oriented to person, place, and time. She appears well-developed and well-nourished.    HENT:   Head: Normocephalic and atraumatic.    Eyes: Pupils are equal, round, and reactive to light. EOM are normal.     Cardiovascular:  Normal rate, regular rhythm and normal heart sounds.             Pulmonary/Chest: Effort normal and breath sounds normal.        Abdominal: Soft. Bowel sounds are normal.             Musculoskeletal: Normal range of motion and moves all extremeties.       Neurological: She is alert and oriented to person, place, and time. She has normal reflexes.     Psychiatric: She has a normal mood and affect. Her behavior is normal. Judgment and thought content normal.            Assessment and Plan     1. Vaginal atrophy    2. Urethral prolapse    3. Pain in other joint    4. Pelvic pain    5. Back pain, unspecified back location, unspecified back pain laterality, unspecified chronicity             Plan:  I explained to the patient that hormone replacement is indicated for hot flashes and osteoporosis prevention. HRT is not indicated for joint pain. Will obtain US as she still has ovaries for assessment.  Otherwise, will refer to Rheumatology for arthritis.

## 2024-05-05 NOTE — PROCEDURES
Right ovary 2.0 x 1.64 x 1.25 cm     Left ovary 2.73 x 1.92 by 1.59 cm     Impression:   Uterus surgically absent   No free fluid visualized

## 2024-07-17 ENCOUNTER — OFFICE VISIT (OUTPATIENT)
Dept: FAMILY MEDICINE | Facility: CLINIC | Age: 59
End: 2024-07-17
Payer: OTHER GOVERNMENT

## 2024-07-17 VITALS
HEART RATE: 68 BPM | HEIGHT: 69 IN | TEMPERATURE: 98 F | RESPIRATION RATE: 16 BRPM | OXYGEN SATURATION: 99 % | SYSTOLIC BLOOD PRESSURE: 134 MMHG | DIASTOLIC BLOOD PRESSURE: 89 MMHG | WEIGHT: 212.38 LBS | BODY MASS INDEX: 31.45 KG/M2

## 2024-07-17 DIAGNOSIS — Z01.419 WELL WOMAN EXAM WITH ROUTINE GYNECOLOGICAL EXAM: ICD-10-CM

## 2024-07-17 DIAGNOSIS — Z13.220 SCREENING FOR HYPERLIPIDEMIA: ICD-10-CM

## 2024-07-17 DIAGNOSIS — Z12.31 ENCOUNTER FOR SCREENING MAMMOGRAM FOR MALIGNANT NEOPLASM OF BREAST: ICD-10-CM

## 2024-07-17 DIAGNOSIS — Z13.1 SCREENING FOR DIABETES MELLITUS: ICD-10-CM

## 2024-07-17 DIAGNOSIS — I10 BENIGN HYPERTENSION: Primary | ICD-10-CM

## 2024-07-17 LAB
ALBUMIN SERPL BCP-MCNC: 3.6 G/DL (ref 3.5–5)
ALBUMIN/GLOB SERPL: 0.8 {RATIO}
ALP SERPL-CCNC: 91 U/L (ref 46–118)
ALT SERPL W P-5'-P-CCNC: 28 U/L (ref 13–56)
ANION GAP SERPL CALCULATED.3IONS-SCNC: 11 MMOL/L (ref 7–16)
AST SERPL W P-5'-P-CCNC: 22 U/L (ref 15–37)
BASOPHILS # BLD AUTO: 0.02 K/UL (ref 0–0.2)
BASOPHILS NFR BLD AUTO: 0.6 % (ref 0–1)
BILIRUB SERPL-MCNC: 0.4 MG/DL (ref ?–1.2)
BUN SERPL-MCNC: 13 MG/DL (ref 7–18)
BUN/CREAT SERPL: 13 (ref 6–20)
CALCIUM SERPL-MCNC: 9.5 MG/DL (ref 8.5–10.1)
CHLORIDE SERPL-SCNC: 105 MMOL/L (ref 98–107)
CHOLEST SERPL-MCNC: 147 MG/DL (ref 0–200)
CHOLEST/HDLC SERPL: 3.4 {RATIO}
CO2 SERPL-SCNC: 27 MMOL/L (ref 21–32)
CREAT SERPL-MCNC: 0.98 MG/DL (ref 0.55–1.02)
CREAT UR-MCNC: 246 MG/DL (ref 28–219)
DIFFERENTIAL METHOD BLD: ABNORMAL
EGFR (NO RACE VARIABLE) (RUSH/TITUS): 67 ML/MIN/1.73M2
EOSINOPHIL # BLD AUTO: 0.06 K/UL (ref 0–0.5)
EOSINOPHIL NFR BLD AUTO: 1.8 % (ref 1–4)
ERYTHROCYTE [DISTWIDTH] IN BLOOD BY AUTOMATED COUNT: 13.8 % (ref 11.5–14.5)
EST. AVERAGE GLUCOSE BLD GHB EST-MCNC: 117 MG/DL
GLOBULIN SER-MCNC: 4.5 G/DL (ref 2–4)
GLUCOSE SERPL-MCNC: 87 MG/DL (ref 74–106)
HBA1C MFR BLD HPLC: 5.7 % (ref 4.5–6.6)
HCT VFR BLD AUTO: 42.3 % (ref 38–47)
HDLC SERPL-MCNC: 43 MG/DL (ref 40–60)
HGB BLD-MCNC: 13.6 G/DL (ref 12–16)
IMM GRANULOCYTES # BLD AUTO: 0.03 K/UL (ref 0–0.04)
IMM GRANULOCYTES NFR BLD: 0.9 % (ref 0–0.4)
LDLC SERPL CALC-MCNC: 81 MG/DL
LDLC/HDLC SERPL: 1.9 {RATIO}
LYMPHOCYTES # BLD AUTO: 1.44 K/UL (ref 1–4.8)
LYMPHOCYTES NFR BLD AUTO: 42.9 % (ref 27–41)
MCH RBC QN AUTO: 27.3 PG (ref 27–31)
MCHC RBC AUTO-ENTMCNC: 32.2 G/DL (ref 32–36)
MCV RBC AUTO: 84.8 FL (ref 80–96)
MICROALBUMIN UR-MCNC: 1 MG/DL (ref 0–2.8)
MICROALBUMIN/CREAT RATIO PNL UR: 4.1 MG/G (ref 0–30)
MONOCYTES # BLD AUTO: 0.32 K/UL (ref 0–0.8)
MONOCYTES NFR BLD AUTO: 9.5 % (ref 2–6)
MPC BLD CALC-MCNC: 10.6 FL (ref 9.4–12.4)
NEUTROPHILS # BLD AUTO: 1.49 K/UL (ref 1.8–7.7)
NEUTROPHILS NFR BLD AUTO: 44.3 % (ref 53–65)
NONHDLC SERPL-MCNC: 104 MG/DL
NRBC # BLD AUTO: 0 X10E3/UL
NRBC, AUTO (.00): 0 %
PLATELET # BLD AUTO: 253 K/UL (ref 150–400)
POTASSIUM SERPL-SCNC: 3.6 MMOL/L (ref 3.5–5.1)
PROT SERPL-MCNC: 8.1 G/DL (ref 6.4–8.2)
RBC # BLD AUTO: 4.99 M/UL (ref 4.2–5.4)
SODIUM SERPL-SCNC: 139 MMOL/L (ref 136–145)
TRIGL SERPL-MCNC: 114 MG/DL (ref 35–150)
VLDLC SERPL-MCNC: 23 MG/DL
WBC # BLD AUTO: 3.36 K/UL (ref 4.5–11)

## 2024-07-17 PROCEDURE — 82043 UR ALBUMIN QUANTITATIVE: CPT | Mod: ,,, | Performed by: CLINICAL MEDICAL LABORATORY

## 2024-07-17 PROCEDURE — 82570 ASSAY OF URINE CREATININE: CPT | Mod: ,,, | Performed by: CLINICAL MEDICAL LABORATORY

## 2024-07-17 PROCEDURE — 99214 OFFICE O/P EST MOD 30 MIN: CPT | Mod: ,,, | Performed by: NURSE PRACTITIONER

## 2024-07-17 PROCEDURE — 83036 HEMOGLOBIN GLYCOSYLATED A1C: CPT | Mod: ,,, | Performed by: CLINICAL MEDICAL LABORATORY

## 2024-07-17 PROCEDURE — 80053 COMPREHEN METABOLIC PANEL: CPT | Mod: ,,, | Performed by: CLINICAL MEDICAL LABORATORY

## 2024-07-17 PROCEDURE — 80061 LIPID PANEL: CPT | Mod: ,,, | Performed by: CLINICAL MEDICAL LABORATORY

## 2024-07-17 PROCEDURE — 85025 COMPLETE CBC W/AUTO DIFF WBC: CPT | Mod: ,,, | Performed by: CLINICAL MEDICAL LABORATORY

## 2024-07-17 RX ORDER — HYDROCHLOROTHIAZIDE 25 MG/1
25 TABLET ORAL DAILY
Qty: 90 TABLET | Refills: 1 | Status: SHIPPED | OUTPATIENT
Start: 2024-07-17

## 2024-07-17 NOTE — ASSESSMENT & PLAN NOTE
BP Readings from Last 3 Encounters:   07/17/24 134/89   04/16/24 131/85   04/09/24 135/88    The current medical regimen is effective;  continue present plan and medications.

## 2024-07-17 NOTE — PROGRESS NOTES
IZZY Garcia   [facility] [address] [place] [address]  [phone]      PATIENT NAME: Isidra Mcintosh  : 1965  DATE: 24  MRN: [id number]      Billing Provider: IZZY Garcia  Level of Service:   Patient PCP Information       Provider PCP Type    IZZY Garcia General            Reason for Visit / Chief Complaint: Follow-up and Hypertension       Update PCP  Update Chief Complaint         History of Present Illness / Problem Focused Workflow     Isidra Mcintosh presents to the clinic with Follow-up and Hypertension     Pt presents for routine follow up with lab and med refills. Overall doing well.      BP appears  controlled today. Home meds reviewed  The current medical regimen is effective;  continue present plan and medications. Recommended patient to check home readings to monitor and see me for followup as scheduled or sooner as needed.   Discussed sodium restriction, maintaining ideal body weight and regular exercise program as physiologic means to continue to achieve blood pressure control in addition to medication compliance.  Patient was educated that both decongestant and anti-inflammatory medication may raise blood pressure.  Advised to monitor BP at home. Advised on optimal BP readings - SBP < 130 & DBP < 80. Advised to call office for any persistent BP elevation and may have to prescribe or adjust BP med(s).  Recommended DASH diet, stay well hydrated with water daily, eliminate or decrease caffeinated and high calorie drinks, increase physical activity, and lose weight if BMI > 25.0. Written patient education information provided to patient with goals and recommendations to assist with BP management.     Discussed need for low fat/low cholesterol diet, regular exercise, and weight control.   Cardiovascular risk and specific lipid/LDL goals reviewed.    Patient  was requesting referral to gyn for her annual well wanting evaluation.  She was requesting to go to [facility].  She was also requesting for her routine mammogram be done at [facility] in [place].  Referral and orders placed for the above.  Patient was previously seen by gyn at [facility] in [place].  She underwent a pelvic ultrasound due to some hip and abdominal discomfort.  Ultrasound was reviewed and discussed patient.        Review of Systems     Review of Systems   Constitutional:  Negative for fatigue and fever.   HENT:  Negative for nasal congestion and sore throat.    Eyes:  Negative for visual disturbance.   Respiratory:  Negative for chest tightness and shortness of breath.    Cardiovascular:  Negative for chest pain and leg swelling.   Gastrointestinal:  Negative for abdominal pain and change in bowel habit.   Endocrine: Negative for polydipsia, polyphagia and polyuria.   Genitourinary:  Negative for dysuria and hematuria.   Musculoskeletal:  Negative for back pain and leg pain.   Integumentary:  Negative for rash.   Neurological:  Negative for dizziness, syncope, weakness and light-headedness.        Medical / Social / Family History     Past Medical History:   Diagnosis Date    Essential hypertension, malignant     Vitamin D deficiency        Past Surgical History:   Procedure Laterality Date    BREAST SURGERY       SECTION      HYSTERECTOMY         Social History  Ms. Mcintosh  reports that she has never smoked. She has never been exposed to tobacco smoke. She has never used smokeless tobacco. She reports that she does not drink alcohol and does not use drugs.    Family History  Ms. Mcintosh's family history includes COPD in her father; Diabetes in her father; Heart disease in her maternal grandmother and maternal uncle; Hypertension in her father and mother; Kidney disease in her father.    Medications and Allergies     Medications  Outpatient Medications Marked as Taking for the  24 encounter (Office Visit) with Katie Frazier ACNP   Medication Sig Dispense Refill    cetirizine (ZYRTEC) 10 MG tablet Take 1 tablet (10 mg total) by mouth every other day. 90 tablet 1    ergocalciferol (ERGOCALCIFEROL) 50,000 unit Cap Take 1 capsule (50,000 Units total) by mouth every 7 days. 4 capsule 11    estradioL (ESTRACE) 0.01 % (0.1 mg/gram) vaginal cream Place 0.5 g vaginally 3 (three) times a week. 42.5 g 1    fluticasone propionate (FLONASE) 50 mcg/actuation nasal spray SMARTSI-2 Spray(s) Both Nares Daily PRN 16 g 3    multivitamin (THERAGRAN) per tablet Take 1 tablet by mouth once daily. 90 tablet 1       Allergies  Review of patient's allergies indicates:  No Known Allergies    Physical Examination     Vitals:    24 0848   BP: 134/89   Pulse:    Resp:    Temp:      Physical Exam  Eyes:      Pupils: Pupils are equal, round, and reactive to light.   Cardiovascular:      Rate and Rhythm: Normal rate and regular rhythm.      Heart sounds: Normal heart sounds. No murmur heard.  Pulmonary:      Breath sounds: Normal breath sounds. No wheezing, rhonchi or rales.   Abdominal:      General: Bowel sounds are normal.      Palpations: Abdomen is soft.   Musculoskeletal:         General: No swelling.      Cervical back: Normal range of motion and neck supple.   Skin:     General: Skin is warm and dry.   Neurological:      Mental Status: She is alert and oriented to person, place, and time.          Lab Results   Component Value Date    WBC 4.48 (L) 2024    HGB 13.6 2024    HCT 41.7 2024    MCV 83.7 2024     2024        Sodium   Date Value Ref Range Status   2024 138 136 - 145 mmol/L Final     Potassium   Date Value Ref Range Status   2024 3.6 3.5 - 5.1 mmol/L Final     Chloride   Date Value Ref Range Status   2024 104 98 - 107 mmol/L Final     CO2   Date Value Ref Range Status   2024 32 21 - 32 mmol/L Final     Glucose   Date Value  "Ref Range Status   01/17/2024 93 74 - 106 mg/dL Final     BUN   Date Value Ref Range Status   01/17/2024 15 7 - 18 mg/dL Final     Creatinine   Date Value Ref Range Status   01/17/2024 1.07 (H) 0.55 - 1.02 mg/dL Final     Calcium   Date Value Ref Range Status   01/17/2024 10.0 8.5 - 10.1 mg/dL Final     Total Protein   Date Value Ref Range Status   01/17/2024 8.7 (H) 6.4 - 8.2 g/dL Final     Albumin   Date Value Ref Range Status   01/17/2024 3.7 3.5 - 5.0 g/dL Final     Bilirubin, Total   Date Value Ref Range Status   01/17/2024 0.5 >0.0 - 1.2 mg/dL Final     Alk Phos   Date Value Ref Range Status   01/17/2024 90 46 - 118 U/L Final     AST   Date Value Ref Range Status   01/17/2024 16 15 - 37 U/L Final     ALT   Date Value Ref Range Status   01/17/2024 24 13 - 56 U/L Final     Anion Gap   Date Value Ref Range Status   01/17/2024 6 (L) 7 - 16 mmol/L Final     eGFR   Date Value Ref Range Status   01/17/2024 61 >=60 mL/min/1.73m2 Final      Lab Results   Component Value Date    HGBA1C 5.5 07/17/2023      Lab Results   Component Value Date    CHOL 188 01/17/2024    CHOL 176 07/17/2023    CHOL 170 01/18/2023     Lab Results   Component Value Date    HDL 39 (L) 01/17/2024    HDL 37 (L) 07/17/2023    HDL 40 01/18/2023     Lab Results   Component Value Date    LDLCALC 121 01/17/2024    LDLCALC 107 07/17/2023    LDLCALC 101 01/18/2023     No results found for: "DLDL"  Lab Results   Component Value Date    TRIG 138 01/17/2024    TRIG 162 (H) 07/17/2023    TRIG 147 01/18/2023     Lab Results   Component Value Date    CHOLHDL 4.8 01/17/2024    CHOLHDL 4.8 07/17/2023    CHOLHDL 4.3 01/18/2023      No results found for: "TSH", "H5RSGDH", "S2QFQCS", "THYROIDAB", "FREET4"     Assessment and Plan (including Health Maintenance)      Problem List  Smart Sets  Document Outside HM   :    Plan:     1. Benign hypertension  Overview:  bp appears well controlled today  cont home meds    Assessment & Plan:  BP Readings from Last 3 " Encounters:   07/17/24 134/89   04/16/24 131/85   04/09/24 135/88    The current medical regimen is effective;  continue present plan and medications.      Orders:  -     CBC Auto Differential; Future; Expected date: 07/17/2024  -     Comprehensive Metabolic Panel; Future; Expected date: 07/17/2024  -     Microalbumin/Creatinine Ratio, Urine; Future; Expected date: 07/17/2024  -     hydroCHLOROthiazide (HYDRODIURIL) 25 MG tablet; Take 1 tablet (25 mg total) by mouth once daily.  Dispense: 90 tablet; Refill: 1    2. Screening for diabetes mellitus  -     Hemoglobin A1C; Future; Expected date: 07/17/2024    3. Screening for hyperlipidemia  -     Lipid Panel; Future; Expected date: 07/17/2024    4. Encounter for screening mammogram for malignant neoplasm of breast  -     Mammo Digital Screening Bilat; Future; Expected date: 07/17/2024    5. Well woman exam with routine gynecological exam  -     Ambulatory referral/consult to Gynecology; Future; Expected date: 07/24/2024         There are no Patient Instructions on file for this visit.     Health Maintenance Due   Topic Date Due    Diabetes Urine Screening  07/17/2024    Hemoglobin A1c  07/17/2024    Mammogram  08/22/2024         Health Maintenance Topics with due status: Not Due       Topic Last Completion Date    Colorectal Cancer Screening 02/23/2018    Influenza Vaccine 01/17/2024    Lipid Panel 01/17/2024       Future Appointments   Date Time Provider Department Center   8/21/2024  2:00 PM Nathanael Chatterjee MD [facility] OBGYN [facility]   1/27/2025  8:00 AM Katie Frazier ACNP [facility]            Signature:  IZZY [facility] [address] [place] [address]  [phone]    Date of encounter: 7/17/24

## 2024-07-26 DIAGNOSIS — M54.41 ACUTE BILATERAL LOW BACK PAIN WITH RIGHT-SIDED SCIATICA: Primary | ICD-10-CM

## 2024-08-06 ENCOUNTER — PATIENT MESSAGE (OUTPATIENT)
Dept: FAMILY MEDICINE | Facility: CLINIC | Age: 59
End: 2024-08-06
Payer: OTHER GOVERNMENT

## 2024-08-19 LAB — BCS RECOMMENDATION EXT: NORMAL

## 2024-08-21 ENCOUNTER — PATIENT MESSAGE (OUTPATIENT)
Dept: FAMILY MEDICINE | Facility: CLINIC | Age: 59
End: 2024-08-21
Payer: OTHER GOVERNMENT

## 2024-08-29 DIAGNOSIS — M54.41 ACUTE BILATERAL LOW BACK PAIN WITH RIGHT-SIDED SCIATICA: Primary | ICD-10-CM

## 2024-09-25 NOTE — PROGRESS NOTES
Subjective:         Patient ID: Isidra Mcintosh is a 59 y.o. female.    Chief Complaint: Low-back Pain      Pain  This is a chronic problem. The current episode started more than 1 year ago. The problem occurs daily. The problem has been waxing and waning. Associated symptoms include arthralgias. Pertinent negatives include no anorexia, change in bowel habit, chest pain, chills, coughing, diaphoresis, fever, neck pain, rash, sore throat, swollen glands, urinary symptoms, vertigo or vomiting.     Review of Systems   Constitutional:  Negative for activity change, appetite change, chills, diaphoresis, fever and unexpected weight change.   HENT:  Negative for drooling, ear discharge, ear pain, facial swelling, nosebleeds, sore throat, trouble swallowing, voice change and goiter.    Eyes:  Negative for photophobia, pain, discharge, redness and visual disturbance.   Respiratory:  Negative for apnea, cough, choking, chest tightness, shortness of breath, wheezing and stridor.    Cardiovascular:  Negative for chest pain, palpitations and leg swelling.   Gastrointestinal:  Negative for abdominal distention, anorexia, change in bowel habit, diarrhea, rectal pain, vomiting and fecal incontinence.   Endocrine: Negative for cold intolerance, heat intolerance, polydipsia, polyphagia and polyuria.   Genitourinary:  Negative for bladder incontinence, dysuria, flank pain, frequency and hot flashes.   Musculoskeletal:  Positive for arthralgias, back pain and leg pain. Negative for neck pain.   Integumentary:  Negative for color change, pallor and rash.   Allergic/Immunologic: Negative for immunocompromised state.   Neurological:  Negative for dizziness, vertigo, seizures, syncope, facial asymmetry, speech difficulty, light-headedness, memory loss and coordination difficulties.   Hematological:  Negative for adenopathy. Does not bruise/bleed easily.   Psychiatric/Behavioral:  Negative for agitation, behavioral problems, confusion,  decreased concentration, dysphoric mood, hallucinations, self-injury and suicidal ideas. The patient is not nervous/anxious and is not hyperactive.            Past Medical History:   Diagnosis Date    Essential hypertension, malignant     Vitamin D deficiency      Past Surgical History:   Procedure Laterality Date    BREAST SURGERY       SECTION      HYSTERECTOMY       Social History     Socioeconomic History    Marital status:    Tobacco Use    Smoking status: Never     Passive exposure: Never    Smokeless tobacco: Never   Substance and Sexual Activity    Alcohol use: Never    Drug use: Never    Sexual activity: Yes     Social Drivers of Health     Financial Resource Strain: Low Risk  (7/15/2024)    Overall Financial Resource Strain (CARDIA)     Difficulty of Paying Living Expenses: Not hard at all   Food Insecurity: No Food Insecurity (7/15/2024)    Hunger Vital Sign     Worried About Running Out of Food in the Last Year: Never true     Ran Out of Food in the Last Year: Never true   Transportation Needs: No Transportation Needs (2024)    PRAPARE - Transportation     Lack of Transportation (Medical): No     Lack of Transportation (Non-Medical): No   Physical Activity: Sufficiently Active (7/15/2024)    Exercise Vital Sign     Days of Exercise per Week: 5 days     Minutes of Exercise per Session: 30 min   Stress: No Stress Concern Present (7/15/2024)    [employer] - Occupational Stress Questionnaire     Feeling of Stress : Not at all   Housing Stability: Low Risk  (2024)    Housing Stability Vital Sign     Unable to Pay for Housing in the Last Year: No     Number of Places Lived in the Last Year: 1     Unstable Housing in the Last Year: No     Family History   Problem Relation Name Age of Onset    Hypertension Mother      Diabetes Father      Hypertension Father      COPD Father      Kidney disease Father      Heart disease Maternal Uncle      Heart disease  "Maternal Grandmother       Review of patient's allergies indicates:  No Known Allergies     Objective:  Vitals:    10/08/24 1308   BP: 137/89   Pulse: 72   Resp: 18   Weight: 100.7 kg (222 lb)   Height: 5' 9" (1.753 m)   PainSc:   4         Physical Exam  Vitals and nursing note reviewed. Exam conducted with a chaperone present.   Constitutional:       General: She is awake. She is not in acute distress.     Appearance: Normal appearance. She is not ill-appearing, toxic-appearing or diaphoretic.   HENT:      Head: Normocephalic and atraumatic.      Nose: Nose normal.      Mouth/Throat:      Mouth: Mucous membranes are moist.      Pharynx: Oropharynx is clear.   Eyes:      Conjunctiva/sclera: Conjunctivae normal.      Pupils: Pupils are equal, round, and reactive to light.   Cardiovascular:      Rate and Rhythm: Normal rate.   Pulmonary:      Effort: Pulmonary effort is normal. No respiratory distress.   Abdominal:      Palpations: Abdomen is soft.      Tenderness: There is no guarding.   Musculoskeletal:         General: Normal range of motion.      Cervical back: Normal range of motion and neck supple. No rigidity.   Skin:     General: Skin is warm and dry.      Coloration: Skin is not jaundiced or pale.   Neurological:      General: No focal deficit present.      Mental Status: She is alert and oriented to person, place, and time. Mental status is at baseline.      Cranial Nerves: No cranial nerve deficit (II-XII).   Psychiatric:         Mood and Affect: Mood normal.         Behavior: Behavior normal. Behavior is cooperative.         Thought Content: Thought content normal.           X-Ray Lumbar Spine AP And Lateral  Narrative: EXAMINATION:  XR LUMBAR SPINE AP AND LATERAL    CLINICAL HISTORY:  .  Lumbago with sciatica, right side    TECHNIQUE:  XR LUMBAR SPINE AP AND LATERAL    COMPARISON:  none    FINDINGS:  No acute fracture.    No malalignment.    Mild multi-level degenerative change.  Impression: No acute " fracture.    No malalignment.    Mild multi-level degenerative change.    Electronically signed by: Faheem Null  Date:    04/11/2024  Time:    09:30  X-Ray Hip 3 or 4 views Bilateral  Narrative: EXAMINATION:  XR HIP 3 OR 4 VIEWS BILATERAL    CLINICAL HISTORY:  Lumbago with sciatica, right side    TECHNIQUE:  XR HIP 3 OR 4 VIEWS BILATERAL    COMPARISON:  None    FINDINGS:  No acute fracture or dislocation.    Mild degenerative change of the hips    No radiopaque foreign bodies.  Impression: No acute findings    Mild degenerative change of the hips    Electronically signed by: Faheem Null  Date:    04/11/2024  Time:    09:30       Office Visit on 07/17/2024   Component Date Value Ref Range Status    Sodium 07/17/2024 139  136 - 145 mmol/L Final    Potassium 07/17/2024 3.6  3.5 - 5.1 mmol/L Final    Chloride 07/17/2024 105  98 - 107 mmol/L Final    CO2 07/17/2024 27  21 - 32 mmol/L Final    Anion Gap 07/17/2024 11  7 - 16 mmol/L Final    Glucose 07/17/2024 87  74 - 106 mg/dL Final    BUN 07/17/2024 13  7 - 18 mg/dL Final    Creatinine 07/17/2024 0.98  0.55 - 1.02 mg/dL Final    BUN/Creatinine Ratio 07/17/2024 13  6 - 20 Final    Calcium 07/17/2024 9.5  8.5 - 10.1 mg/dL Final    Total Protein 07/17/2024 8.1  6.4 - 8.2 g/dL Final    Albumin 07/17/2024 3.6  3.5 - 5.0 g/dL Final    Globulin 07/17/2024 4.5 (H)  2.0 - 4.0 g/dL Final    A/G Ratio 07/17/2024 0.8   Final    Bilirubin, Total 07/17/2024 0.4  >0.0 - 1.2 mg/dL Final    Alk Phos 07/17/2024 91  46 - 118 U/L Final    ALT 07/17/2024 28  13 - 56 U/L Final    AST 07/17/2024 22  15 - 37 U/L Final    eGFR 07/17/2024 67  >=60 mL/min/1.73m2 Final    Triglycerides 07/17/2024 114  35 - 150 mg/dL Final    Cholesterol 07/17/2024 147  0 - 200 mg/dL Final    HDL Cholesterol 07/17/2024 43  40 - 60 mg/dL Final    Cholesterol/HDL Ratio (Risk Factor) 07/17/2024 3.4   Final    Non-HDL 07/17/2024 104  mg/dL Final    LDL Calculated 07/17/2024 81  mg/dL Final    LDL/HDL  07/17/2024 1.9   Final    VLDL 07/17/2024 23  mg/dL Final    Hemoglobin A1C 07/17/2024 5.7  4.5 - 6.6 % Final    Estimated Average Glucose 07/17/2024 117  mg/dL Final    Creatinine, Urine 07/17/2024 246 (H)  28 - 219 mg/dL Final    Microalbumin 07/17/2024 1.0  0.0 - 2.8 mg/dL Final    Microalbumin/Creatinine Ratio 07/17/2024 4.1  0.0 - 30.0 mg/g Final    WBC 07/17/2024 3.36 (L)  4.50 - 11.00 K/uL Final    RBC 07/17/2024 4.99  4.20 - 5.40 M/uL Final    Hemoglobin 07/17/2024 13.6  12.0 - 16.0 g/dL Final    Hematocrit 07/17/2024 42.3  38.0 - 47.0 % Final    MCV 07/17/2024 84.8  80.0 - 96.0 fL Final    MCH 07/17/2024 27.3  27.0 - 31.0 pg Final    MCHC 07/17/2024 32.2  32.0 - 36.0 g/dL Final    RDW 07/17/2024 13.8  11.5 - 14.5 % Final    Platelet Count 07/17/2024 253  150 - 400 K/uL Final    MPV 07/17/2024 10.6  9.4 - 12.4 fL Final    Neutrophils % 07/17/2024 44.3 (L)  53.0 - 65.0 % Final    Lymphocytes % 07/17/2024 42.9 (H)  27.0 - 41.0 % Final    Monocytes % 07/17/2024 9.5 (H)  2.0 - 6.0 % Final    Eosinophils % 07/17/2024 1.8  1.0 - 4.0 % Final    Basophils % 07/17/2024 0.6  0.0 - 1.0 % Final    Immature Granulocytes % 07/17/2024 0.9 (H)  0.0 - 0.4 % Final    nRBC, Auto 07/17/2024 0.0  <=0.0 % Final    Neutrophils, Abs 07/17/2024 1.49 (L)  1.80 - 7.70 K/uL Final    Lymphocytes, Absolute 07/17/2024 1.44  1.00 - 4.80 K/uL Final    Monocytes, Absolute 07/17/2024 0.32  0.00 - 0.80 K/uL Final    Eosinophils, Absolute 07/17/2024 0.06  0.00 - 0.50 K/uL Final    Basophils, Absolute 07/17/2024 0.02  0.00 - 0.20 K/uL Final    Immature Granulocytes, Absolute 07/17/2024 0.03  0.00 - 0.04 K/uL Final    nRBC, Absolute 07/17/2024 0.00  <=0.00 x10e3/uL Final    Diff Type 07/17/2024 Auto   Final   Office Visit on 04/09/2024   Component Date Value Ref Range Status    Color, UA 04/09/2024 Yellow   Final    Spec Grav UA 04/09/2024 1.030   Final    pH, UA 04/09/2024 6.0   Final    WBC, UA 04/09/2024 neg   Final    Nitrite, UA 04/09/2024  neg   Final    Protein, POC 04/09/2024 neg   Final    Glucose, UA 04/09/2024 neg   Final    Ketones, UA 04/09/2024 neg   Final    Bilirubin, POC 04/09/2024 neg   Final    Urobilinogen, UA 04/09/2024 0.2   Final    Blood, UA 04/09/2024 trace-intact   Final    Culture, Urine 04/09/2024 No Growth   Final         Orders Placed This Encounter   Procedures    MRI Lumbar Spine Without Contrast     Standing Status:   Future     Standing Expiration Date:   10/8/2025     Order Specific Question:   Does the patient have or ever had a pacemaker or a defibrillator (Note: Some facilities may not be able to schedule an MRI for patients with pacemakers and defibrillators. You should contact your local radiology dept to determine if this is the case.)?     Answer:   No     Order Specific Question:   Does the patient have an aneurysm or surgical clip, pump, nerve/brain stimulator, middle/inner ear prosthesis, or other metal implant or foreign object (bullet, shrapnel)? If they have a card related to their implant, ask them to bring it. Issues related to the implant may cause the MRI to be delayed.     Answer:   No     Order Specific Question:   Is the patient claustrophobic?     Answer:   No     Order Specific Question:   Will the patient require po anxiolysis or sedation?     Answer:   No     Order Specific Question:   Does the patient have any of the following conditions? Diabetes, History of Renal Disease or Hypertension requiring medical therapy?     Answer:   No     Order Specific Question:   May the Radiologist modify the order per protocol to meet the clinical needs of the patient?     Answer:   Yes     Order Specific Question:   Is this part of a Research Study?     Answer:   No     Order Specific Question:   Recist criteria?     Answer:   No     Order Specific Question:   Will this service be billed to a Worker's Comp policy?     Answer:   No     Order Specific Question:   Does the patient have on a skin patch for medication  with aluminized backing?     Answer:   No    Drug Screen Definitive 14, Urine     Standing Status:   Future     Number of Occurrences:   1     Standing Expiration Date:   12/7/2025     Order Specific Question:   Specimen Source     Answer:   Urine    POCT Urine Drug Screen Presump     Interpretive Information:     Negative:  No drug detected at the cut off level.   Positive:  This result represents presumptive positive for the   tested drug, other substances may yield a positive response other   than the analyte of interest. This result should be utilized for   diagnostic purpose only. Confirmation testing will be performed upon physician request only.          Requested Prescriptions      No prescriptions requested or ordered in this encounter       Assessment:     1. Encounter for long-term (current) use of medications    2. Lumbar radiculopathy, chronic    3. Sacroiliitis         A's of Opioid Risk Assessment  Activity:Patient can perform ADL.   Analgesia:Patients pain is partially controlled by current medication. Patient has tried OTC medications such as Tylenol and Ibuprofen with out relief.   Adverse Effects: Patient denies constipation or sedation.  Aberrant Behavior:  reviewed with no aberrant drug seeking/taking behavior.  Overdose reversal drug naloxone discussed    Drug screen reviewed      Plan:    New patient     Katie Forrest NP    Back pain    No MRI lumbar spine for review    No physical therapy notes in epic    X-ray bilateral hips [facility] April 11, 2024  No fracture noted  Mild degenerative changes    X-ray lumbar spine [facility] April 11, 2024  Mild degenerative changes multiple levels      October 8, 2024     Patient presents clinic today complaining of multiple year history back pain pointing to her sacroiliac area buttock and bilateral leg pain numbness and tingling radicular in nature    She states is worse with standing walking worse with flexion-extension rotation made better  with short periods resting or changing positions     She denies loss of bowel or bladder function     She did have chiropractic treatment which helps but does not improve her discomfort     Will obtain these records     No recent physical therapy     Neurologically intact     After discussing options recommend we proceed with     Toradol 60 mg IM, tolerated    MRI lumbar spine no contrast lumbar radiculopathy  MRI for consideration procedure/surgery    I have given the patient medically directed home exercise program    Patient has tried NSAIDs and neuromodulators (neurontin, lyrica, elavil, or cymbalta)    Follow-up after above studies discuss options    Dr. Bonilla    Bring original prescription medication bottles/container/box with labels to each visit    Greater than 30 minutes spent on this encounter including 10 minutes reviewing imaging and notes, 15 minutes with the patient, 5 minutes documentation

## 2024-10-08 ENCOUNTER — OFFICE VISIT (OUTPATIENT)
Dept: PAIN MEDICINE | Facility: CLINIC | Age: 59
End: 2024-10-08
Payer: OTHER GOVERNMENT

## 2024-10-08 VITALS
WEIGHT: 222 LBS | RESPIRATION RATE: 18 BRPM | BODY MASS INDEX: 32.88 KG/M2 | SYSTOLIC BLOOD PRESSURE: 137 MMHG | HEIGHT: 69 IN | HEART RATE: 72 BPM | DIASTOLIC BLOOD PRESSURE: 89 MMHG

## 2024-10-08 DIAGNOSIS — M54.16 LUMBAR RADICULOPATHY, CHRONIC: Chronic | ICD-10-CM

## 2024-10-08 DIAGNOSIS — Z79.899 ENCOUNTER FOR LONG-TERM (CURRENT) USE OF MEDICATIONS: Primary | ICD-10-CM

## 2024-10-08 DIAGNOSIS — M46.1 SACROILIITIS: Chronic | ICD-10-CM

## 2024-10-08 LAB

## 2024-10-08 PROCEDURE — 99999 PR PBB SHADOW E&M-EST. PATIENT-LVL IV: CPT | Mod: PBBFAC,,, | Performed by: PHYSICIAN ASSISTANT

## 2024-10-08 PROCEDURE — 99999PBSHW PR PBB SHADOW TECHNICAL ONLY FILED TO HB: Mod: PBBFAC,,,

## 2024-10-08 PROCEDURE — 99999PBSHW POCT URINE DRUG SCREEN PRESUMP: Mod: PBBFAC,,,

## 2024-10-08 PROCEDURE — 99203 OFFICE O/P NEW LOW 30 MIN: CPT | Mod: S$PBB,25,, | Performed by: PHYSICIAN ASSISTANT

## 2024-10-08 PROCEDURE — 80305 DRUG TEST PRSMV DIR OPT OBS: CPT | Mod: PBBFAC | Performed by: PHYSICIAN ASSISTANT

## 2024-10-08 PROCEDURE — 99214 OFFICE O/P EST MOD 30 MIN: CPT | Mod: PBBFAC,25 | Performed by: PHYSICIAN ASSISTANT

## 2024-10-08 PROCEDURE — 96372 THER/PROPH/DIAG INJ SC/IM: CPT | Mod: PBBFAC | Performed by: PHYSICIAN ASSISTANT

## 2024-10-08 RX ORDER — KETOROLAC TROMETHAMINE 30 MG/ML
60 INJECTION, SOLUTION INTRAMUSCULAR; INTRAVENOUS
Status: COMPLETED | OUTPATIENT
Start: 2024-10-08 | End: 2024-10-08

## 2024-10-08 RX ADMIN — KETOROLAC TROMETHAMINE 60 MG: 30 INJECTION, SOLUTION INTRAMUSCULAR at 01:10

## 2024-10-12 LAB
1OH-MIDAZOLAM UR QL SCN: NOT DETECTED NG/ML
2-OH-ETHYLFLURAZ UR QL SCN: NOT DETECTED NG/ML
6MAM UR QL SCN: NOT DETECTED NG/ML
7AMINOCLONAZEPAM UR QL SCN: NOT DETECTED NG/ML
7AMINOFLUNITRAZEPAM UR QL SCN: NOT DETECTED NG/ML
A-OH ALPRAZ GLUC UR QL SCN: NOT DETECTED NG/ML
A-OH ALPRAZ UR QL SCN: NOT DETECTED NG/ML
A-OH-TRIAZOLAM UR QL SCN: NOT DETECTED NG/ML
ALPRAZ UR QL SCN: NOT DETECTED NG/ML
AMPHET UR QL SCN: NOT DETECTED NG/ML
ANNOTATION COMMENT IMP: NORMAL
BARBITURATES UR QL SCN>200 NG/ML: NEGATIVE NG/ML
BUPRENORPHINE UR QL SCN: NOT DETECTED NG/ML
C6G UR QL SCN: NOT DETECTED NG/ML
CHLORDIAZEP UR QL SCN: NOT DETECTED NG/ML
CLOBAZAM UR QL SCN: NOT DETECTED NG/ML
CLONAZEPAM UR QL SCN: NOT DETECTED NG/ML
COCAINE UR QL SCN: NEGATIVE NG/ML
CODEINE UR QL SCN: NOT DETECTED NG/ML
CREAT UR-MCNC: 44 MG/DL
DHC UR QL SCN: NOT DETECTED NG/ML
DIAZEPAM UR QL SCN: NOT DETECTED NG/ML
DRUG SCREEN COMMENT UR-IMP: NORMAL
EDDP UR QL SCN: NOT DETECTED NG/ML
EPHEDRIN UR QL SCN: NOT DETECTED NG/ML
FENTANYL UR QL SCN: NOT DETECTED NG/ML
FLUNITRAZEPAM UR QL SCN: NOT DETECTED NG/ML
FLURAZEPAM UR QL SCN: NOT DETECTED NG/ML
H3G UR QL SCN: NOT DETECTED NG/ML
HYDROCODONE UR QL SCN: NOT DETECTED NG/ML
HYDROMORPHONE UR QL SCN: NOT DETECTED NG/ML
LORAZEPAM GLUCURONIDE UR QL SCN: NOT DETECTED NG/ML
LORAZEPAM UR QL SCN: NOT DETECTED NG/ML
MDA UR QL SCN: NOT DETECTED NG/ML
MDEA UR QL SCN: NOT DETECTED NG/ML
MDMA UR QL SCN: NOT DETECTED NG/ML
ME-PHENIDATE UR QL SCN: NOT DETECTED NG/ML
MEDICATIONS MEDICATION.CURRENT: NORMAL
MEPERIDINE UR QL SCN: NOT DETECTED NG/ML
METHADONE UR QL SCN: NOT DETECTED NG/ML
METHAMPHET UR QL SCN: NOT DETECTED NG/ML
MIDAZOLAM UR QL SCN: NOT DETECTED NG/ML
MORPHINE UR QL SCN: NOT DETECTED NG/ML
MORPHINE-6-GLUCURONIDE UR QL SCN: NOT DETECTED NG/ML
N3G UR QL SCN: NOT DETECTED NG/ML
NALOXONE UR QL SCN: NOT DETECTED NG/ML
NALOXONE-3G UR QL SCN: NOT DETECTED NG/ML
NORBUPRENORPHINE UR QL SCN: NOT DETECTED NG/ML
NORCLOBAZAM UR QL SCN: NOT DETECTED NG/ML
NORDIAZEPAM UR QL SCN: NOT DETECTED NG/ML
NORFENTANYL UR QL: NOT DETECTED NG/ML
NORHYDROCODONE UR QL SCN: NOT DETECTED NG/ML
NORMEPERIDINE UR QL: NOT DETECTED NG/ML
NOROXYCODONE UR QL SCN: NOT DETECTED NG/ML
NOROXYMORPHONE UR QL SCN: NOT DETECTED NG/ML
NORPROPOXYPH UR QL SCN: NOT DETECTED NG/ML
NORTAPENTADOL UR QL SCN: NOT DETECTED NG/ML
O-NORTRAMADOL UR QL: NOT DETECTED NG/ML
O3G UR QL SCN: NOT DETECTED NG/ML
OXAZEPAM GLUCURONIDE UR QL SCN: NOT DETECTED NG/ML
OXAZEPAM UR QL SCN: NOT DETECTED NG/ML
OXIDANTS UR QL: NEGATIVE
OXYCODONE UR QL SCN: NOT DETECTED NG/ML
OXYMORPHONE UR QL SCN: NOT DETECTED NG/ML
PCP UR QL SCN: NOT DETECTED NG/ML
PH UR: 6.1 [PH]
PHENTERMINE UR QL SCN: NOT DETECTED NG/ML
PPAA UR QL SCN: NOT DETECTED NG/ML
PRAZEPAM UR QL SCN: NOT DETECTED NG/ML
PROPOXYPH UR QL SCN: NOT DETECTED NG/ML
PSEUDOEPHEDRINE UR QL SCN: NOT DETECTED NG/ML
SP GR UR REFRACTOMETRY: 1.01
TAPEN GLUC UR QL SCN: NOT DETECTED NG/ML
TAPENTADOL UR QL SCN: NOT DETECTED NG/ML
TEMAZEPAM GLUCURONIDE UR QL SCN: NOT DETECTED NG/ML
TEMAZEPAM UR QL SCN: NOT DETECTED NG/ML
THC UR QL SCN: NEGATIVE NG/ML
TOXICOLOGIST REVIEW: NORMAL
TOXICOLOGIST REVIEW: NORMAL
TRAMADOL UR QL SCN: NOT DETECTED NG/ML
TRIAZOLAM UR QL SCN: NOT DETECTED NG/ML
ZOLPIDEM PHENYL-4-CARB UR QL SCN: NOT DETECTED NG/ML
ZOLPIDEM UR QL SCN: NOT DETECTED NG/ML

## 2024-10-23 ENCOUNTER — PATIENT MESSAGE (OUTPATIENT)
Dept: PAIN MEDICINE | Facility: CLINIC | Age: 59
End: 2024-10-23
Payer: OTHER GOVERNMENT

## 2024-11-04 ENCOUNTER — OFFICE VISIT (OUTPATIENT)
Dept: PAIN MEDICINE | Facility: CLINIC | Age: 59
End: 2024-11-04
Payer: OTHER GOVERNMENT

## 2024-11-04 VITALS
WEIGHT: 219 LBS | HEART RATE: 67 BPM | RESPIRATION RATE: 18 BRPM | HEIGHT: 69 IN | DIASTOLIC BLOOD PRESSURE: 85 MMHG | BODY MASS INDEX: 32.44 KG/M2 | SYSTOLIC BLOOD PRESSURE: 135 MMHG

## 2024-11-04 DIAGNOSIS — M54.16 LUMBAR RADICULOPATHY, CHRONIC: Chronic | ICD-10-CM

## 2024-11-04 DIAGNOSIS — M46.1 SACROILIITIS: Primary | Chronic | ICD-10-CM

## 2024-11-04 PROCEDURE — 99215 OFFICE O/P EST HI 40 MIN: CPT | Mod: PBBFAC | Performed by: PHYSICIAN ASSISTANT

## 2024-11-04 PROCEDURE — 99999 PR PBB SHADOW E&M-EST. PATIENT-LVL V: CPT | Mod: PBBFAC,,, | Performed by: PHYSICIAN ASSISTANT

## 2024-11-04 PROCEDURE — 99999PBSHW PR PBB SHADOW TECHNICAL ONLY FILED TO HB: Mod: PBBFAC,,,

## 2024-11-04 PROCEDURE — 99214 OFFICE O/P EST MOD 30 MIN: CPT | Mod: S$PBB,25,, | Performed by: PHYSICIAN ASSISTANT

## 2024-11-04 PROCEDURE — 96372 THER/PROPH/DIAG INJ SC/IM: CPT | Mod: PBBFAC | Performed by: PHYSICIAN ASSISTANT

## 2024-11-04 RX ORDER — KETOROLAC TROMETHAMINE 30 MG/ML
60 INJECTION, SOLUTION INTRAMUSCULAR; INTRAVENOUS
Status: COMPLETED | OUTPATIENT
Start: 2024-11-04 | End: 2024-11-04

## 2024-11-04 RX ADMIN — KETOROLAC TROMETHAMINE 60 MG: 30 INJECTION, SOLUTION INTRAMUSCULAR at 01:11

## 2024-11-04 NOTE — PROGRESS NOTES
Subjective:         Patient ID: Isidra Mcintosh is a 59 y.o. female.    Chief Complaint: Low-back Pain and Hip Pain      Pain  This is a chronic problem. The current episode started more than 1 year ago. The problem occurs daily. The problem has been unchanged. Associated symptoms include arthralgias. Pertinent negatives include no anorexia, change in bowel habit, chest pain, chills, coughing, diaphoresis, fever, neck pain, rash, sore throat, swollen glands, urinary symptoms, vertigo or vomiting.     Review of Systems   Constitutional:  Negative for activity change, appetite change, chills, diaphoresis, fever and unexpected weight change.   HENT:  Negative for drooling, ear discharge, ear pain, facial swelling, nosebleeds, sore throat, trouble swallowing, voice change and goiter.    Eyes:  Negative for photophobia, pain, discharge, redness and visual disturbance.   Respiratory:  Negative for apnea, cough, choking, chest tightness, shortness of breath, wheezing and stridor.    Cardiovascular:  Negative for chest pain, palpitations and leg swelling.   Gastrointestinal:  Negative for abdominal distention, anorexia, change in bowel habit, diarrhea, rectal pain, vomiting and fecal incontinence.   Endocrine: Negative for cold intolerance, heat intolerance, polydipsia, polyphagia and polyuria.   Genitourinary:  Negative for bladder incontinence, dysuria, flank pain, frequency and hot flashes.   Musculoskeletal:  Positive for arthralgias, back pain and leg pain. Negative for neck pain.   Integumentary:  Negative for color change, pallor and rash.   Allergic/Immunologic: Negative for immunocompromised state.   Neurological:  Negative for dizziness, vertigo, seizures, syncope, facial asymmetry, speech difficulty, light-headedness, memory loss and coordination difficulties.   Hematological:  Negative for adenopathy. Does not bruise/bleed easily.   Psychiatric/Behavioral:  Negative for agitation, behavioral problems,  Final Anesthesia Post-op Assessment    Patient: Darrick Montana  Procedure(s) Performed: LEFT KNEE ARTHROSCOPY - LEFT  Anesthesia type: General    Vitals Value Taken Time   Temp 36.0 08/11/21 0731   Pulse 59 08/11/21 0726   Resp 16 08/11/21 0726   SpO2 94 % 08/11/21 0726   /87 08/11/21 0726         Patient Location: PACU Phase 1  Post-op Vital Signs:stable  Level of Consciousness: awake and alert  Respiratory Status: spontaneous ventilation  Cardiovascular stable  Hydration: euvolemic  Pain Management: adequately controlled  Handoff: Handoff to receiving nurse was performed and questions were answered  Vomiting: none  Nausea: None  Airway Patency:patent  Post-op Assessment: no complications and patient tolerated procedure well with no complications      No complications documented.    confusion, decreased concentration, dysphoric mood, hallucinations, self-injury and suicidal ideas. The patient is not nervous/anxious and is not hyperactive.            Past Medical History:   Diagnosis Date    Essential hypertension, malignant     Vitamin D deficiency      Past Surgical History:   Procedure Laterality Date    BREAST SURGERY       SECTION      HYSTERECTOMY       Social History     Socioeconomic History    Marital status:    Tobacco Use    Smoking status: Never     Passive exposure: Never    Smokeless tobacco: Never   Substance and Sexual Activity    Alcohol use: Never    Drug use: Never    Sexual activity: Yes     Social Drivers of Health     Financial Resource Strain: Low Risk  (7/15/2024)    Overall Financial Resource Strain (CARDIA)     Difficulty of Paying Living Expenses: Not hard at all   Food Insecurity: No Food Insecurity (7/15/2024)    Hunger Vital Sign     Worried About Running Out of Food in the Last Year: Never true     Ran Out of Food in the Last Year: Never true   Transportation Needs: No Transportation Needs (2024)    PRAPARE - Transportation     Lack of Transportation (Medical): No     Lack of Transportation (Non-Medical): No   Physical Activity: Sufficiently Active (7/15/2024)    Exercise Vital Sign     Days of Exercise per Week: 5 days     Minutes of Exercise per Session: 30 min   Stress: No Stress Concern Present (7/15/2024)    [employer] [place] [employer] - Occupational Stress Questionnaire     Feeling of Stress : Not at all   Housing Stability: Low Risk  (2024)    Housing Stability Vital Sign     Unable to Pay for Housing in the Last Year: No     Number of Places Lived in the Last Year: 1     Unstable Housing in the Last Year: No     Family History   Problem Relation Name Age of Onset    Hypertension Mother      Diabetes Father      Hypertension Father      COPD Father      Kidney disease Father      Heart disease Maternal Uncle      Heart  "disease Maternal Grandmother       Review of patient's allergies indicates:  No Known Allergies     Objective:  Vitals:    11/04/24 1252   BP: 135/85   Pulse: 67   Resp: 18   Weight: 99.3 kg (219 lb)   Height: 5' 9" (1.753 m)   PainSc:   8         Physical Exam  Vitals and nursing note reviewed. Exam conducted with a chaperone present.   Constitutional:       General: She is awake. She is not in acute distress.     Appearance: Normal appearance. She is not ill-appearing, toxic-appearing or diaphoretic.   HENT:      Head: Normocephalic and atraumatic.      Nose: Nose normal.      Mouth/Throat:      Mouth: Mucous membranes are moist.      Pharynx: Oropharynx is clear.   Eyes:      Conjunctiva/sclera: Conjunctivae normal.      Pupils: Pupils are equal, round, and reactive to light.   Cardiovascular:      Rate and Rhythm: Normal rate.   Pulmonary:      Effort: Pulmonary effort is normal. No respiratory distress.   Abdominal:      Palpations: Abdomen is soft.      Tenderness: There is no guarding.   Musculoskeletal:         General: Normal range of motion.      Cervical back: Normal range of motion and neck supple. No rigidity.   Skin:     General: Skin is warm and dry.      Coloration: Skin is not jaundiced or pale.   Neurological:      General: No focal deficit present.      Mental Status: She is alert and oriented to person, place, and time. Mental status is at baseline.      Cranial Nerves: No cranial nerve deficit (II-XII).   Psychiatric:         Mood and Affect: Mood normal.         Behavior: Behavior normal. Behavior is cooperative.         Thought Content: Thought content normal.           MRI Lumbar Spine Without Contrast  Narrative: EXAMINATION:  MRI LUMBAR SPINE WITHOUT CONTRAST    CLINICAL HISTORY:  Lumbar radiculopathy, symptoms persist with conservative treatment; Radiculopathy, lumbar region    TECHNIQUE:  Multiplanar, multisequence MR images were acquired from the thoracolumbar junction to the sacrum " without the administration of contrast.    COMPARISON:  X-ray 04/11/2024    FINDINGS:  Alignment: Normal.    Vertebrae: Normal marrow signal. No fracture.    Discs: Normal height and signal.    Cord: Normal.  Conus terminates at L1    Degenerative findings:    T12-L1: No significant abnormality.    L1-L2: No significant abnormality.    L2-L3: No significant abnormality.    L3-L4: No significant abnormality.    L4-L5: Minimal disc bulge.  No significant central canal or foraminal narrowing.    L5-S1: No significant abnormality.    Paraspinal muscles & soft tissues: Unremarkable.  Impression: No acute abnormality.    Minimal degenerative changes.    Electronically signed by: Carlton Hilario  Date:    11/04/2024  Time:    15:04       Office Visit on 10/08/2024   Component Date Value Ref Range Status    POC Amphetamines 10/08/2024 Negative  Negative, Inconclusive Final    POC Barbiturates 10/08/2024 Negative  Negative, Inconclusive Final    POC Benzodiazepines 10/08/2024 Negative  Negative, Inconclusive Final    POC Cocaine 10/08/2024 Negative  Negative, Inconclusive Final    POC THC 10/08/2024 Negative  Negative, Inconclusive Final    POC Methadone 10/08/2024 Negative  Negative, Inconclusive Final    POC Methamphetamine 10/08/2024 Negative  Negative, Inconclusive Final    POC Opiates 10/08/2024 Negative  Negative, Inconclusive Final    POC Oxycodone 10/08/2024 Negative  Negative, Inconclusive Final    POC Phencyclidine 10/08/2024 Negative  Negative, Inconclusive Final    POC Methylenedioxymethamphetamine * 10/08/2024 Negative  Negative, Inconclusive Final    POC Tricyclic Antidepressants 10/08/2024 Negative  Negative, Inconclusive Final    POC Buprenorphine 10/08/2024 Negative   Final     Acceptable 10/08/2024 Yes   Final    POC Temperature (Urine) 10/08/2024 92   Final    Creatinine, U 10/08/2024 44.0  mg/dL Final    Specific Gravity 10/08/2024 1.009   Final    pH 10/08/2024 6.1   Final    Oxidants  10/08/2024 Negative  Cutoff: 200 mg/L Final    Comment 10/08/2024 Normal   Final    Codeine 10/08/2024 Not Detected  Cutoff: 25 ng/mL Final    C6BG 10/08/2024 Not Detected  Cutoff: 100 ng/mL Final    Morphine 10/08/2024 Not Detected  Cutoff: 25 ng/mL Final    M6BG 10/08/2024 Not Detected  Cutoff: 100 ng/mL Final    6 Monoacetylmorphine 10/08/2024 Not Detected  Cutoff: 25 ng/mL Final    Hydrocodone 10/08/2024 Not Detected  Cutoff: 25 ng/mL Final    Norhydrocodone 10/08/2024 Not Detected  Cutoff: 25 ng/mL Final    Dihydrocodeine 10/08/2024 Not Detected  Cutoff: 25 ng/mL Final    Hydromorphone 10/08/2024 Not Detected  Cutoff: 25 ng/mL Final    Hydromorphone-3-beta-glucuronide 10/08/2024 Not Detected  Cutoff: 100 ng/mL Final    Oxycodone 10/08/2024 Not Detected  Cutoff: 25 ng/mL Final    Noroxycodone 10/08/2024 Not Detected  Cutoff: 25 ng/mL Final    Oxymorphone 10/08/2024 Not Detected  Cutoff: 25 ng/mL Final    Oxymorphone-3-beta-glucuronid 10/08/2024 Not Detected  Cutoff: 100 ng/mL Final    Noroxymorphone 10/08/2024 Not Detected  Cutoff: 25 ng/mL Final    Fentanyl 10/08/2024 Not Detected  Cutoff: 2 ng/mL Final    Norfentanyl 10/08/2024 Not Detected  Cutoff: 2 ng/mL Final    Meperidine 10/08/2024 Not Detected  Cutoff: 25 ng/mL Final    Normeperidine 10/08/2024 Not Detected  Cutoff: 25 ng/mL Final    Naloxone 10/08/2024 Not Detected  Cutoff: 25 ng/mL Final    Naloxone-3-beta-glucuronide 10/08/2024 Not Detected  Cutoff: 100 ng/mL Final    Methadone 10/08/2024 Not Detected  Cutoff: 25 ng/mL Final    EDDP 10/08/2024 Not Detected  Cutoff: 25 ng/mL Final    Propoxyphene 10/08/2024 Not Detected  Cutoff: 25 ng/mL Final    Norpropoxyphene 10/08/2024 Not Detected  Cutoff: 25 ng/mL Final    Tramadol 10/08/2024 Not Detected  Cutoff: 25 ng/mL Final    O-desmethyltramadol 10/08/2024 Not Detected  Cutoff: 25 ng/mL Final    Tapentadol 10/08/2024 Not Detected  Cutoff: 25 ng/mL Final    N-Desmethyltapentadol 10/08/2024 Not Detected   Cutoff: 50 ng/mL Final    M TAPENTADOL-BETA-GLUCURONIDE 10/08/2024 Not Detected  Cutoff: 100 ng/mL Final    Buprenorphine 10/08/2024 Not Detected  Cutoff: 5 ng/mL Final    Norbuprenorphine 10/08/2024 Not Detected  Cutoff: 5 ng/mL Final    Norbuprenorphine glucuronide 10/08/2024 Not Detected  Cutoff: 20 ng/mL Final    Opioid Interpretation 10/08/2024 SEE COMMENTS   Final    Cocaine 10/08/2024 Negative  Cutoff: 150 ng/mL Final    Tetrahydrocannabinol 10/08/2024 Negative  Cutoff: 50 ng/mL Final    Alprazolam 10/08/2024 Not Detected  Cutoff: 10 ng/mL Final    Alpha-Hydroxyalprazolam 10/08/2024 Not Detected  Cutoff: 10 ng/mL Final    Alpha-Hydroxyalprazolam Glucuronide 10/08/2024 Not Detected  Cutoff: 50 ng/mL Final    Chlordiazepoxide 10/08/2024 Not Detected  Cutoff: 10 ng/mL Final    Clobazam 10/08/2024 Not Detected  Cutoff: 10 ng/mL Final    N-Desmethylclobazam 10/08/2024 Not Detected  Cutoff: 200 ng/mL Final    Clonazepam 10/08/2024 Not Detected  Cutoff: 10 ng/mL Final    7-aminoclonazepam 10/08/2024 Not Detected  Cutoff: 10 ng/mL Final    Diazepam 10/08/2024 Not Detected  Cutoff: 10 ng/mL Final    Nordiazepam 10/08/2024 Not Detected  Cutoff: 10 ng/mL Final    Flunitrazepam 10/08/2024 Not Detected  Cutoff: 10 ng/mL Final    7-aminoflunitrazepam 10/08/2024 Not Detected  Cutoff: 10 ng/mL Final    Flurazepam 10/08/2024 Not Detected  Cutoff: 10 ng/mL Final    2-Hydroxy Ethyl Flurazepam 10/08/2024 Not Detected  Cutoff: 10 ng/mL Final    Lorazepam 10/08/2024 Not Detected  Cutoff: 10 ng/mL Final    Lorazepam Glucuronide 10/08/2024 Not Detected  Cutoff: 50 ng/mL Final    Midazolam 10/08/2024 Not Detected  Cutoff: 10 ng/mL Final    Alpha-Hydroxy Midazolam 10/08/2024 Not Detected  Cutoff: 10 ng/mL Final    Oxazepam 10/08/2024 Not Detected  Cutoff: 10 ng/mL Final    Oxazepam Glucuronide 10/08/2024 Not Detected  Cutoff: 50 ng/mL Final    Prazepam 10/08/2024 Not Detected  Cutoff: 10 ng/mL Final    Temazepam 10/08/2024 Not  Detected  Cutoff: 10 ng/mL Final    Temazepam Glucuronide 10/08/2024 Not Detected  Cutoff: 50 ng/mL Final    Triazolam 10/08/2024 Not Detected  Cutoff: 10 ng/mL Final    Alpha-Hydroxy Triazolam 10/08/2024 Not Detected  Cutoff: 10 ng/mL Final    Zolpidem 10/08/2024 Not Detected  Cutoff: 10 ng/mL Final    Zolpidem Phenyl-4-Carboxylic acid 10/08/2024 Not Detected  Cutoff: 10 ng/mL Final    Benzodiazepine Interpretation 10/08/2024 SEE COMMENTS   Final    List Patient's Current Medications 10/08/2024 na   Final    Methamphetamine 10/08/2024 Not Detected  Cutoff: 100 ng/mL Final    Amphetamine 10/08/2024 Not Detected  Cutoff: 100 ng/mL Final    3,4-methylenedioxymethamphetamine * 10/08/2024 Not Detected  Cutoff: 100 ng/mL Final    3,5-xrqebpazwbpxmd-W-ethylamphetam* 10/08/2024 Not Detected  Cutoff: 100 ng/mL Final    3,4-methylenedioxyamphetamine (MDA) 10/08/2024 Not Detected  Cutoff: 100 ng/mL Final    Ephedrine 10/08/2024 Not Detected  Cutoff: 100 ng/mL Final    Pseudoephedrine 10/08/2024 Not Detected  Cutoff: 100 ng/mL Final    Phentermine 10/08/2024 Not Detected  Cutoff: 100 ng/mL Final    Phencyclidine (PCP) 10/08/2024 Not Detected  Cutoff: 20 ng/mL Final    Methylphenidate 10/08/2024 Not Detected  Cutoff: 20 ng/mL Final    Ritalinic acid 10/08/2024 Not Detected  Cutoff: 100 ng/mL Final    Stimulant Interpretation 10/08/2024 SEE COMMENTS   Final    Barbiturates 10/08/2024 Negative  Cutoff: 200 ng/mL Final   Office Visit on 07/17/2024   Component Date Value Ref Range Status    Sodium 07/17/2024 139  136 - 145 mmol/L Final    Potassium 07/17/2024 3.6  3.5 - 5.1 mmol/L Final    Chloride 07/17/2024 105  98 - 107 mmol/L Final    CO2 07/17/2024 27  21 - 32 mmol/L Final    Anion Gap 07/17/2024 11  7 - 16 mmol/L Final    Glucose 07/17/2024 87  74 - 106 mg/dL Final    BUN 07/17/2024 13  7 - 18 mg/dL Final    Creatinine 07/17/2024 0.98  0.55 - 1.02 mg/dL Final    BUN/Creatinine Ratio 07/17/2024 13  6 - 20 Final    Calcium  07/17/2024 9.5  8.5 - 10.1 mg/dL Final    Total Protein 07/17/2024 8.1  6.4 - 8.2 g/dL Final    Albumin 07/17/2024 3.6  3.5 - 5.0 g/dL Final    Globulin 07/17/2024 4.5 (H)  2.0 - 4.0 g/dL Final    A/G Ratio 07/17/2024 0.8   Final    Bilirubin, Total 07/17/2024 0.4  >0.0 - 1.2 mg/dL Final    Alk Phos 07/17/2024 91  46 - 118 U/L Final    ALT 07/17/2024 28  13 - 56 U/L Final    AST 07/17/2024 22  15 - 37 U/L Final    eGFR 07/17/2024 67  >=60 mL/min/1.73m2 Final    Triglycerides 07/17/2024 114  35 - 150 mg/dL Final    Cholesterol 07/17/2024 147  0 - 200 mg/dL Final    HDL Cholesterol 07/17/2024 43  40 - 60 mg/dL Final    Cholesterol/HDL Ratio (Risk Factor) 07/17/2024 3.4   Final    Non-HDL 07/17/2024 104  mg/dL Final    LDL Calculated 07/17/2024 81  mg/dL Final    LDL/HDL 07/17/2024 1.9   Final    VLDL 07/17/2024 23  mg/dL Final    Hemoglobin A1C 07/17/2024 5.7  4.5 - 6.6 % Final    Estimated Average Glucose 07/17/2024 117  mg/dL Final    Creatinine, Urine 07/17/2024 246 (H)  28 - 219 mg/dL Final    Microalbumin 07/17/2024 1.0  0.0 - 2.8 mg/dL Final    Microalbumin/Creatinine Ratio 07/17/2024 4.1  0.0 - 30.0 mg/g Final    WBC 07/17/2024 3.36 (L)  4.50 - 11.00 K/uL Final    RBC 07/17/2024 4.99  4.20 - 5.40 M/uL Final    Hemoglobin 07/17/2024 13.6  12.0 - 16.0 g/dL Final    Hematocrit 07/17/2024 42.3  38.0 - 47.0 % Final    MCV 07/17/2024 84.8  80.0 - 96.0 fL Final    MCH 07/17/2024 27.3  27.0 - 31.0 pg Final    MCHC 07/17/2024 32.2  32.0 - 36.0 g/dL Final    RDW 07/17/2024 13.8  11.5 - 14.5 % Final    Platelet Count 07/17/2024 253  150 - 400 K/uL Final    MPV 07/17/2024 10.6  9.4 - 12.4 fL Final    Neutrophils % 07/17/2024 44.3 (L)  53.0 - 65.0 % Final    Lymphocytes % 07/17/2024 42.9 (H)  27.0 - 41.0 % Final    Monocytes % 07/17/2024 9.5 (H)  2.0 - 6.0 % Final    Eosinophils % 07/17/2024 1.8  1.0 - 4.0 % Final    Basophils % 07/17/2024 0.6  0.0 - 1.0 % Final    Immature Granulocytes % 07/17/2024 0.9 (H)  0.0 - 0.4 %  Final    nRBC, Auto 07/17/2024 0.0  <=0.0 % Final    Neutrophils, Abs 07/17/2024 1.49 (L)  1.80 - 7.70 K/uL Final    Lymphocytes, Absolute 07/17/2024 1.44  1.00 - 4.80 K/uL Final    Monocytes, Absolute 07/17/2024 0.32  0.00 - 0.80 K/uL Final    Eosinophils, Absolute 07/17/2024 0.06  0.00 - 0.50 K/uL Final    Basophils, Absolute 07/17/2024 0.02  0.00 - 0.20 K/uL Final    Immature Granulocytes, Absolute 07/17/2024 0.03  0.00 - 0.04 K/uL Final    nRBC, Absolute 07/17/2024 0.00  <=0.00 x10e3/uL Final    Diff Type 07/17/2024 Auto   Final         Orders Placed This Encounter   Procedures    Case Request Operating Room: BLOCK, SACROILIAC JOINT     Order Specific Question:   Medical Necessity:     Answer:   Medically Non-Urgent [100]     Order Specific Question:   Case classification     Answer:   E - Elective [90]     Order Specific Question:   Is an on-site pathologist required for this procedure?     Answer:   N/A       Requested Prescriptions      No prescriptions requested or ordered in this encounter       Assessment:     1. Sacroiliitis    2. Lumbar radiculopathy, chronic               X-ray bilateral hips [facility] April 11, 2024  No fracture noted  Mild degenerative changes    X-ray lumbar spine [facility] April 11, 2024  Mild degenerative changes multiple levels        Plan:    Not using narcotics from our office November 4, 2024    Follow-up after MRI lumbar spine    Patient presents clinic today complaining of multiple year history back pain pointing to her sacroiliac area buttock and bilateral leg pain numbness and tingling radicular in nature    She states is worse with standing walking worse with flexion-extension rotation made better with short periods resting or changing positions     Neurologically intact     After discussing options recommend we proceed with     Toradol 60 mg IM, tolerated    MRI lumbar spine [facility] November 4, 2024  Right renal cyst  Multiple level degenerative  changes    Patient requesting procedure for bilateral sacroiliac discomfort    Tender bilateral sacroiliac area  Bilateral sacroiliac compression test positive  Moses's /Bear's positive bilateral  Gaenslen positive bilateral  procedure done prior to surgical consideration    Ongoing Physical therapy/home exercise program as directed no longer controlling discomfort    Monitor anesthesia request is medically indicated for the scheduled nerve block procedure due to:  1- needle phobia and anxiety, placing  the patient at risk during the provided service.  2-patient has an ASA class greater than 3 and requires constant presence of an anesthesiologist during the procedure,   3-patient has severe problems hard to lie still  4-patient suffers from chronic pain and is unable to function due to  diminished ADLs    Patient's pain medication no longer helping control discomfort    Schedule bilateral sacroiliac injection # 1, sacroiliitis    Dr. Bonilla    Bring original prescription medication bottles/container/box with labels to each visit

## 2024-11-04 NOTE — PATIENT INSTRUCTIONS

## 2024-11-05 ENCOUNTER — TELEPHONE (OUTPATIENT)
Dept: PAIN MEDICINE | Facility: CLINIC | Age: 59
End: 2024-11-05
Payer: OTHER GOVERNMENT

## 2024-11-05 NOTE — TELEPHONE ENCOUNTER
----- Message from Kika sent at 11/4/2024  3:12 PM CST -----  Regarding: service not covered  Lanette states that this procedure is not a covered benefit and it will not be approved    KIKA MASCORRO   11/05/2024   11:38 AM   Patient notified. Procedure canceled

## 2024-12-16 ENCOUNTER — TELEPHONE (OUTPATIENT)
Dept: FAMILY MEDICINE | Facility: CLINIC | Age: 59
End: 2024-12-16
Payer: OTHER GOVERNMENT

## 2024-12-16 NOTE — TELEPHONE ENCOUNTER
----- Message from Deb sent at 12/16/2024 11:40 AM CST -----  Who Called: Isidra Mcintosh    Preferred Method of Contact: Phone Call  Patient's Preferred Phone Number on File: [phone]   Best Call Back Number, if different:  Additional Information: pt wants to talk to someone about her ear problems before she asked to book an appt

## 2025-01-27 ENCOUNTER — OFFICE VISIT (OUTPATIENT)
Dept: FAMILY MEDICINE | Facility: CLINIC | Age: 60
End: 2025-01-27
Payer: OTHER GOVERNMENT

## 2025-01-27 VITALS
WEIGHT: 207.81 LBS | BODY MASS INDEX: 30.78 KG/M2 | SYSTOLIC BLOOD PRESSURE: 127 MMHG | HEIGHT: 69 IN | TEMPERATURE: 98 F | OXYGEN SATURATION: 99 % | HEART RATE: 70 BPM | DIASTOLIC BLOOD PRESSURE: 79 MMHG | RESPIRATION RATE: 16 BRPM

## 2025-01-27 DIAGNOSIS — I10 BENIGN HYPERTENSION: Primary | ICD-10-CM

## 2025-01-27 DIAGNOSIS — Z13.220 SCREENING FOR HYPERLIPIDEMIA: ICD-10-CM

## 2025-01-27 LAB
ALBUMIN SERPL BCP-MCNC: 3.8 G/DL (ref 3.5–5)
ALBUMIN/GLOB SERPL: 1 {RATIO}
ALP SERPL-CCNC: 80 U/L (ref 40–150)
ALT SERPL W P-5'-P-CCNC: 14 U/L
ANION GAP SERPL CALCULATED.3IONS-SCNC: 15 MMOL/L (ref 7–16)
AST SERPL W P-5'-P-CCNC: 64 U/L (ref 5–34)
BASOPHILS # BLD AUTO: 0.02 K/UL (ref 0–0.2)
BASOPHILS NFR BLD AUTO: 0.5 % (ref 0–1)
BILIRUB SERPL-MCNC: 0.5 MG/DL
BUN SERPL-MCNC: 15 MG/DL (ref 10–20)
BUN/CREAT SERPL: 16 (ref 6–20)
CALCIUM SERPL-MCNC: 9.9 MG/DL (ref 8.4–10.2)
CHLORIDE SERPL-SCNC: 102 MMOL/L (ref 98–107)
CHOLEST SERPL-MCNC: 169 MG/DL
CHOLEST/HDLC SERPL: 4.8 {RATIO}
CO2 SERPL-SCNC: 27 MMOL/L (ref 22–29)
CREAT SERPL-MCNC: 0.93 MG/DL (ref 0.55–1.02)
DIFFERENTIAL METHOD BLD: ABNORMAL
EGFR (NO RACE VARIABLE) (RUSH/TITUS): 71 ML/MIN/1.73M2
EOSINOPHIL # BLD AUTO: 0.04 K/UL (ref 0–0.5)
EOSINOPHIL NFR BLD AUTO: 1 % (ref 1–4)
ERYTHROCYTE [DISTWIDTH] IN BLOOD BY AUTOMATED COUNT: 13.8 % (ref 11.5–14.5)
GLOBULIN SER-MCNC: 3.9 G/DL (ref 2–4)
GLUCOSE SERPL-MCNC: 80 MG/DL (ref 74–100)
HCT VFR BLD AUTO: 43.3 % (ref 38–47)
HDLC SERPL-MCNC: 35 MG/DL (ref 35–60)
HGB BLD-MCNC: 13.5 G/DL (ref 12–16)
IMM GRANULOCYTES # BLD AUTO: 0.01 K/UL (ref 0–0.04)
IMM GRANULOCYTES NFR BLD: 0.2 % (ref 0–0.4)
LDLC SERPL CALC-MCNC: 113 MG/DL
LDLC/HDLC SERPL: 3.2 {RATIO}
LYMPHOCYTES # BLD AUTO: 1.56 K/UL (ref 1–4.8)
LYMPHOCYTES NFR BLD AUTO: 38.6 % (ref 27–41)
MCH RBC QN AUTO: 26.7 PG (ref 27–31)
MCHC RBC AUTO-ENTMCNC: 31.2 G/DL (ref 32–36)
MCV RBC AUTO: 85.7 FL (ref 80–96)
MONOCYTES # BLD AUTO: 0.27 K/UL (ref 0–0.8)
MONOCYTES NFR BLD AUTO: 6.7 % (ref 2–6)
MPC BLD CALC-MCNC: 10.2 FL (ref 9.4–12.4)
NEUTROPHILS # BLD AUTO: 2.14 K/UL (ref 1.8–7.7)
NEUTROPHILS NFR BLD AUTO: 53 % (ref 53–65)
NONHDLC SERPL-MCNC: 134 MG/DL
NRBC # BLD AUTO: 0 X10E3/UL
NRBC, AUTO (.00): 0 %
PLATELET # BLD AUTO: 295 K/UL (ref 150–400)
POTASSIUM SERPL-SCNC: 3.1 MMOL/L (ref 3.5–5.1)
PROT SERPL-MCNC: 7.7 G/DL (ref 6.4–8.3)
RBC # BLD AUTO: 5.05 M/UL (ref 4.2–5.4)
SODIUM SERPL-SCNC: 141 MMOL/L (ref 136–145)
TRIGL SERPL-MCNC: 106 MG/DL (ref 37–140)
VLDLC SERPL-MCNC: 21 MG/DL
WBC # BLD AUTO: 4.04 K/UL (ref 4.5–11)

## 2025-01-27 PROCEDURE — 99214 OFFICE O/P EST MOD 30 MIN: CPT | Mod: ,,, | Performed by: NURSE PRACTITIONER

## 2025-01-27 PROCEDURE — 80061 LIPID PANEL: CPT | Mod: ,,, | Performed by: CLINICAL MEDICAL LABORATORY

## 2025-01-27 PROCEDURE — 85025 COMPLETE CBC W/AUTO DIFF WBC: CPT | Mod: ,,, | Performed by: CLINICAL MEDICAL LABORATORY

## 2025-01-27 PROCEDURE — 80053 COMPREHEN METABOLIC PANEL: CPT | Mod: ,,, | Performed by: CLINICAL MEDICAL LABORATORY

## 2025-01-27 RX ORDER — CETIRIZINE HYDROCHLORIDE 10 MG/1
10 TABLET ORAL EVERY OTHER DAY
Qty: 90 TABLET | Refills: 1 | Status: SHIPPED | OUTPATIENT
Start: 2025-01-27

## 2025-01-27 RX ORDER — FLUTICASONE PROPIONATE 50 MCG
SPRAY, SUSPENSION (ML) NASAL
Qty: 16 G | Refills: 3 | Status: SHIPPED | OUTPATIENT
Start: 2025-01-27

## 2025-01-27 RX ORDER — HYDROCHLOROTHIAZIDE 25 MG/1
25 TABLET ORAL DAILY
Qty: 90 TABLET | Refills: 1 | Status: SHIPPED | OUTPATIENT
Start: 2025-01-27

## 2025-01-27 RX ORDER — SCOLOPAMINE TRANSDERMAL SYSTEM 1 MG/1
1 PATCH, EXTENDED RELEASE TRANSDERMAL
Qty: 3 PATCH | Refills: 0 | Status: SHIPPED | OUTPATIENT
Start: 2025-01-27

## 2025-01-27 NOTE — ASSESSMENT & PLAN NOTE
BP Readings from Last 3 Encounters:   01/27/25 127/79   11/04/24 135/85   10/08/24 137/89    The current medical regimen is effective;  continue present plan and medications.

## 2025-01-27 NOTE — PROGRESS NOTES
IZZY Garcia   [facility] [address] [place] [address]  [phone]      PATIENT NAME: Isidra Mcintosh  : 1965  DATE: 25  MRN: [id number]      Billing Provider: IZZY Garcia  Level of Service:   Patient PCP Information       Provider PCP Type    IZZY Garcia General            Reason for Visit / Chief Complaint: Follow-up and Hypertension (6 mos)       Update PCP  Update Chief Complaint         History of Present Illness / Problem Focused Workflow     Isidra Mcintosh presents to the clinic with Follow-up and Hypertension (6 mos)     Pt presents for routine follow up with lab and med refills. Overall doing well.      BP appears  controlled today. Home meds reviewed    The current medical regimen is effective;  continue present plan and medications. Recommended patient to check home readings to monitor and see me for followup as scheduled or sooner as needed.   Discussed sodium restriction, maintaining ideal body weight and regular exercise program as physiologic means to continue to achieve blood pressure control in addition to medication compliance.  Patient was educated that both decongestant and anti-inflammatory medication may raise blood pressure.  Advised to monitor BP at home. Advised on optimal BP readings - SBP < 130 & DBP < 80. Advised to call office for any persistent BP elevation and may have to prescribe or adjust BP med(s).  Recommended DASH diet, stay well hydrated with water daily, eliminate or decrease caffeinated and high calorie drinks, increase physical activity, and lose weight if BMI > 25.0. Written patient education information provided to patient with goals and recommendations to assist with BP management.     Discussed need for low fat/low cholesterol diet, regular exercise, and weight control.   Cardiovascular risk and specific lipid/LDL goals  reviewed.    Pt has an upcoming cruise she is requesting medication for motion sickness.         Review of Systems     Review of Systems   Constitutional:  Negative for fatigue and fever.   HENT:  Negative for nasal congestion and sore throat.    Eyes:  Negative for visual disturbance.   Respiratory:  Negative for chest tightness and shortness of breath.    Cardiovascular:  Negative for chest pain and leg swelling.   Gastrointestinal:  Negative for abdominal pain and change in bowel habit.   Endocrine: Negative for polydipsia, polyphagia and polyuria.   Genitourinary:  Negative for dysuria and hematuria.   Musculoskeletal:  Negative for back pain and leg pain.   Integumentary:  Negative for rash.   Neurological:  Negative for dizziness, syncope, weakness and light-headedness.        Medical / Social / Family History     Past Medical History:   Diagnosis Date    Essential hypertension, malignant     Vitamin D deficiency        Past Surgical History:   Procedure Laterality Date    BREAST SURGERY       SECTION      HYSTERECTOMY         Social History  Ms. Mcintsoh  reports that she has never smoked. She has never been exposed to tobacco smoke. She has never used smokeless tobacco. She reports that she does not drink alcohol and does not use drugs.    Family History  Ms. Mcintosh's family history includes COPD in her father; Diabetes in her father; Heart disease in her maternal grandmother and maternal uncle; Hypertension in her father and mother; Kidney disease in her father.    Medications and Allergies     Medications  Outpatient Medications Marked as Taking for the 25 encounter (Office Visit) with Katie Frazier ACNP   Medication Sig Dispense Refill    ergocalciferol (ERGOCALCIFEROL) 50,000 unit Cap Take 1 capsule (50,000 Units total) by mouth every 7 days. 4 capsule 11    estradioL (ESTRACE) 0.01 % (0.1 mg/gram) vaginal cream Place 0.5 g vaginally 3 (three) times a week. 42.5 g 1    multivitamin  (THERAGRAN) per tablet Take 1 tablet by mouth once daily. 90 tablet 1    [DISCONTINUED] cetirizine (ZYRTEC) 10 MG tablet Take 1 tablet (10 mg total) by mouth every other day. 90 tablet 1    [DISCONTINUED] fluticasone propionate (FLONASE) 50 mcg/actuation nasal spray SMARTSI-2 Spray(s) Both Nares Daily PRN 16 g 3    [DISCONTINUED] hydroCHLOROthiazide (HYDRODIURIL) 25 MG tablet Take 1 tablet (25 mg total) by mouth once daily. 90 tablet 1       Allergies  Review of patient's allergies indicates:  No Known Allergies    Physical Examination     Vitals:    25 0816   BP: 127/79   Pulse: 70   Resp: 16   Temp: 98 °F (36.7 °C)     Physical Exam  Eyes:      Pupils: Pupils are equal, round, and reactive to light.   Cardiovascular:      Rate and Rhythm: Normal rate and regular rhythm.      Heart sounds: Normal heart sounds. No murmur heard.  Pulmonary:      Breath sounds: Normal breath sounds. No wheezing, rhonchi or rales.   Abdominal:      General: Bowel sounds are normal.   Musculoskeletal:         General: No swelling.      Cervical back: Normal range of motion and neck supple.   Skin:     General: Skin is warm and dry.   Neurological:      Mental Status: She is alert and oriented to person, place, and time.          Lab Results   Component Value Date    WBC 3.36 (L) 2024    HGB 13.6 2024    HCT 42.3 2024    MCV 84.8 2024     2024        Sodium   Date Value Ref Range Status   2024 139 136 - 145 mmol/L Final     Potassium   Date Value Ref Range Status   2024 3.6 3.5 - 5.1 mmol/L Final     Chloride   Date Value Ref Range Status   2024 105 98 - 107 mmol/L Final     CO2   Date Value Ref Range Status   2024 27 21 - 32 mmol/L Final     Glucose   Date Value Ref Range Status   2024 87 74 - 106 mg/dL Final     BUN   Date Value Ref Range Status   2024 13 7 - 18 mg/dL Final     Creatinine   Date Value Ref Range Status   2024 0.98 0.55 - 1.02 mg/dL  "Final     Calcium   Date Value Ref Range Status   07/17/2024 9.5 8.5 - 10.1 mg/dL Final     Total Protein   Date Value Ref Range Status   07/17/2024 8.1 6.4 - 8.2 g/dL Final     Albumin   Date Value Ref Range Status   07/17/2024 3.6 3.5 - 5.0 g/dL Final     Bilirubin, Total   Date Value Ref Range Status   07/17/2024 0.4 >0.0 - 1.2 mg/dL Final     Alk Phos   Date Value Ref Range Status   07/17/2024 91 46 - 118 U/L Final     AST   Date Value Ref Range Status   07/17/2024 22 15 - 37 U/L Final     ALT   Date Value Ref Range Status   07/17/2024 28 13 - 56 U/L Final     Anion Gap   Date Value Ref Range Status   07/17/2024 11 7 - 16 mmol/L Final     eGFR   Date Value Ref Range Status   07/17/2024 67 >=60 mL/min/1.73m2 Final      Lab Results   Component Value Date    HGBA1C 5.7 07/17/2024      Lab Results   Component Value Date    CHOL 147 07/17/2024    CHOL 188 01/17/2024    CHOL 176 07/17/2023     Lab Results   Component Value Date    HDL 43 07/17/2024    HDL 39 (L) 01/17/2024    HDL 37 (L) 07/17/2023     Lab Results   Component Value Date    LDLCALC 81 07/17/2024    LDLCALC 121 01/17/2024    LDLCALC 107 07/17/2023     No results found for: "DLDL"  Lab Results   Component Value Date    TRIG 114 07/17/2024    TRIG 138 01/17/2024    TRIG 162 (H) 07/17/2023     Lab Results   Component Value Date    CHOLHDL 3.4 07/17/2024    CHOLHDL 4.8 01/17/2024    CHOLHDL 4.8 07/17/2023      No results found for: "TSH", "V9RFNSP", "H2IJGVV", "THYROIDAB", "FREET4"     Assessment and Plan (including Health Maintenance)      Problem List  Smart Sets  Document Outside HM   :    Plan:     1. Benign hypertension  Overview:  bp appears well controlled today  cont home meds    Assessment & Plan:  BP Readings from Last 3 Encounters:   01/27/25 127/79   11/04/24 135/85   10/08/24 137/89    The current medical regimen is effective;  continue present plan and medications.      Orders:  -     CBC Auto Differential; Future; Expected date: 01/27/2025  -  "    Comprehensive Metabolic Panel; Future; Expected date: 2025  -     hydroCHLOROthiazide (HYDRODIURIL) 25 MG tablet; Take 1 tablet (25 mg total) by mouth once daily.  Dispense: 90 tablet; Refill: 1    2. Screening for hyperlipidemia  -     Lipid Panel; Future; Expected date: 2025    Other orders  -     cetirizine (ZYRTEC) 10 MG tablet; Take 1 tablet (10 mg total) by mouth every other day.  Dispense: 90 tablet; Refill: 1  -     fluticasone propionate (FLONASE) 50 mcg/actuation nasal spray; SMARTSI-2 Spray(s) Both Nares Daily PRN  Dispense: 16 g; Refill: 3  -     scopolamine (TRANSDERM-SCOP) 1.3-1.5 mg (1 mg over 3 days); Place 1 patch onto the skin every 72 hours.  Dispense: 3 patch; Refill: 0         There are no Patient Instructions on file for this visit.     Health Maintenance Due   Topic Date Due    Shingles Vaccine (1 of 2) Never done    Mammogram  2024         Health Maintenance Topics with due status: Not Due       Topic Last Completion Date    Colorectal Cancer Screening 2018    Diabetes Urine Screening 2024    Lipid Panel 2024    Hemoglobin A1c 2024    RSV Vaccine (Age 60+ and Pregnant patients) Not Due       Future Appointments   Date Time Provider Department Center   2025  8:00 AM Katie Frazier ACNP [facility] [place]            Signature:  IZZY [facility] [address] [place] [address]  [phone]    Date of encounter: 25

## 2025-05-18 ENCOUNTER — PATIENT MESSAGE (OUTPATIENT)
Dept: FAMILY MEDICINE | Facility: CLINIC | Age: 60
End: 2025-05-18
Payer: OTHER GOVERNMENT

## 2025-07-30 ENCOUNTER — OFFICE VISIT (OUTPATIENT)
Dept: FAMILY MEDICINE | Facility: CLINIC | Age: 60
End: 2025-07-30
Payer: OTHER GOVERNMENT

## 2025-07-30 ENCOUNTER — PATIENT OUTREACH (OUTPATIENT)
Facility: HOSPITAL | Age: 60
End: 2025-07-30
Payer: OTHER GOVERNMENT

## 2025-07-30 VITALS
SYSTOLIC BLOOD PRESSURE: 130 MMHG | BODY MASS INDEX: 30.76 KG/M2 | HEART RATE: 61 BPM | DIASTOLIC BLOOD PRESSURE: 86 MMHG | RESPIRATION RATE: 16 BRPM | WEIGHT: 207.69 LBS | OXYGEN SATURATION: 99 % | HEIGHT: 69 IN | TEMPERATURE: 98 F

## 2025-07-30 DIAGNOSIS — Z13.1 SCREENING FOR DIABETES MELLITUS: ICD-10-CM

## 2025-07-30 DIAGNOSIS — I10 BENIGN HYPERTENSION: Primary | ICD-10-CM

## 2025-07-30 DIAGNOSIS — Z12.31 BREAST CANCER SCREENING BY MAMMOGRAM: ICD-10-CM

## 2025-07-30 DIAGNOSIS — J30.2 SEASONAL ALLERGIES: ICD-10-CM

## 2025-07-30 LAB
ALBUMIN SERPL BCP-MCNC: 3.8 G/DL (ref 3.5–5)
ALBUMIN/GLOB SERPL: 1 {RATIO}
ALP SERPL-CCNC: 76 U/L (ref 40–150)
ALT SERPL W P-5'-P-CCNC: 15 U/L
ANION GAP SERPL CALCULATED.3IONS-SCNC: 10 MMOL/L (ref 7–16)
AST SERPL W P-5'-P-CCNC: 25 U/L (ref 11–45)
BASOPHILS # BLD AUTO: 0.03 K/UL (ref 0–0.2)
BASOPHILS NFR BLD AUTO: 0.8 % (ref 0–1)
BILIRUB SERPL-MCNC: 0.5 MG/DL
BUN SERPL-MCNC: 13 MG/DL (ref 10–20)
BUN/CREAT SERPL: 13 (ref 6–20)
CALCIUM SERPL-MCNC: 9.8 MG/DL (ref 8.4–10.2)
CHLORIDE SERPL-SCNC: 104 MMOL/L (ref 98–107)
CHOLEST SERPL-MCNC: 162 MG/DL
CHOLEST/HDLC SERPL: 4.6 {RATIO}
CO2 SERPL-SCNC: 30 MMOL/L (ref 22–29)
CREAT SERPL-MCNC: 1.02 MG/DL (ref 0.55–1.02)
CREAT UR-MCNC: 165 MG/DL (ref 15–325)
DIFFERENTIAL METHOD BLD: ABNORMAL
EGFR (NO RACE VARIABLE) (RUSH/TITUS): 64 ML/MIN/1.73M2
EOSINOPHIL # BLD AUTO: 0.05 K/UL (ref 0–0.5)
EOSINOPHIL NFR BLD AUTO: 1.3 % (ref 1–4)
ERYTHROCYTE [DISTWIDTH] IN BLOOD BY AUTOMATED COUNT: 13.8 % (ref 11.5–14.5)
EST. AVERAGE GLUCOSE BLD GHB EST-MCNC: 105 MG/DL
GLOBULIN SER-MCNC: 4 G/DL (ref 2–4)
GLUCOSE SERPL-MCNC: 77 MG/DL (ref 74–100)
HBA1C MFR BLD HPLC: 5.3 %
HCT VFR BLD AUTO: 41 % (ref 38–47)
HDLC SERPL-MCNC: 35 MG/DL (ref 35–60)
HGB BLD-MCNC: 12.9 G/DL (ref 12–16)
IMM GRANULOCYTES # BLD AUTO: 0.01 K/UL (ref 0–0.04)
IMM GRANULOCYTES NFR BLD: 0.3 % (ref 0–0.4)
LDLC SERPL CALC-MCNC: 104 MG/DL
LDLC/HDLC SERPL: 3 {RATIO}
LYMPHOCYTES # BLD AUTO: 1.32 K/UL (ref 1–4.8)
LYMPHOCYTES NFR BLD AUTO: 34.1 % (ref 27–41)
MCH RBC QN AUTO: 27.3 PG (ref 27–31)
MCHC RBC AUTO-ENTMCNC: 31.5 G/DL (ref 32–36)
MCV RBC AUTO: 86.7 FL (ref 80–96)
MICROALBUMIN UR-MCNC: 0.7 MG/DL
MICROALBUMIN/CREAT RATIO PNL UR: 4.2 MG/G (ref 0–30)
MONOCYTES # BLD AUTO: 0.32 K/UL (ref 0–0.8)
MONOCYTES NFR BLD AUTO: 8.3 % (ref 2–6)
MPC BLD CALC-MCNC: 10.1 FL (ref 9.4–12.4)
NEUTROPHILS # BLD AUTO: 2.14 K/UL (ref 1.8–7.7)
NEUTROPHILS NFR BLD AUTO: 55.2 % (ref 53–65)
NONHDLC SERPL-MCNC: 127 MG/DL
NRBC # BLD AUTO: 0 X10E3/UL
NRBC, AUTO (.00): 0 %
PLATELET # BLD AUTO: 269 K/UL (ref 150–400)
POTASSIUM SERPL-SCNC: 3.7 MMOL/L (ref 3.5–5.1)
PROT SERPL-MCNC: 7.8 G/DL (ref 6.4–8.3)
RBC # BLD AUTO: 4.73 M/UL (ref 4.2–5.4)
SODIUM SERPL-SCNC: 140 MMOL/L (ref 136–145)
TRIGL SERPL-MCNC: 114 MG/DL (ref 37–140)
VLDLC SERPL-MCNC: 23 MG/DL
WBC # BLD AUTO: 3.87 K/UL (ref 4.5–11)

## 2025-07-30 PROCEDURE — 85025 COMPLETE CBC W/AUTO DIFF WBC: CPT | Mod: ,,, | Performed by: CLINICAL MEDICAL LABORATORY

## 2025-07-30 PROCEDURE — 83036 HEMOGLOBIN GLYCOSYLATED A1C: CPT | Mod: ,,, | Performed by: CLINICAL MEDICAL LABORATORY

## 2025-07-30 PROCEDURE — 80061 LIPID PANEL: CPT | Mod: ,,, | Performed by: CLINICAL MEDICAL LABORATORY

## 2025-07-30 PROCEDURE — 80053 COMPREHEN METABOLIC PANEL: CPT | Mod: ,,, | Performed by: CLINICAL MEDICAL LABORATORY

## 2025-07-30 PROCEDURE — 99214 OFFICE O/P EST MOD 30 MIN: CPT | Mod: ,,, | Performed by: NURSE PRACTITIONER

## 2025-07-30 PROCEDURE — 82043 UR ALBUMIN QUANTITATIVE: CPT | Mod: ,,, | Performed by: CLINICAL MEDICAL LABORATORY

## 2025-07-30 PROCEDURE — 82570 ASSAY OF URINE CREATININE: CPT | Mod: ,,, | Performed by: CLINICAL MEDICAL LABORATORY

## 2025-07-30 RX ORDER — CHOLECALCIFEROL (VITAMIN D3) 25 MCG
1000 TABLET ORAL DAILY
COMMUNITY

## 2025-07-30 RX ORDER — HYDROCHLOROTHIAZIDE 25 MG/1
25 TABLET ORAL DAILY
Qty: 90 TABLET | Refills: 1 | Status: SHIPPED | OUTPATIENT
Start: 2025-07-30

## 2025-07-30 RX ORDER — CETIRIZINE HYDROCHLORIDE 10 MG/1
10 TABLET ORAL EVERY OTHER DAY
Qty: 90 TABLET | Refills: 1 | Status: SHIPPED | OUTPATIENT
Start: 2025-07-30

## 2025-07-30 NOTE — ASSESSMENT & PLAN NOTE
BP Readings from Last 3 Encounters:   07/30/25 130/86   01/27/25 127/79   11/04/24 135/85    Well controlled  Refill HCTZ  The current medical regimen is effective;  continue present plan and medications.     negative

## 2025-07-30 NOTE — PROGRESS NOTES
Population Health Chart Review & Patient Outreach Details      Further Action Needed If Patient Returns Outreach:        Health Maintenance Due   Topic Date Due    Shingles Vaccine (1 of 2) Never done    Mammogram  2024    Diabetes Urine Screening  2025    Hemoglobin A1c  2025          Updates Requested / Reviewed:     [x]  Care Everywhere    [x]     []  External Sources ([facility], [facility], DIS, etc.)    [] [facility]   [] [facility]   [] Other:    [x]  Care Team Updated   []  Removed  or Duplicate Orders   []  Immunization Reconciliation Completed / Queried    [] [place]   [] [place]   [] [place]   [] [place]      Health Maintenance Topics Addressed and Outreach Outcomes / Actions Taken:             Breast Cancer Screening []  Mammogram Order Placed    []  Mammogram Screening Scheduled    [x]  External Records Requested & Care Team Updated if Applicable/2025 [place] to [facility] for mammogram     []  External Records Uploaded & Care Team Updated if Applicable    []  Pt Declined Scheduling Mammogram    []  Pt Will Schedule with External Provider / Order Routed & Care Team Updated if Applicable              Cervical Cancer Screening []  Pap Smear Scheduled in Primary Care or OBGYN    []  External Records Requested & Care Team Updated if Applicable       []  External Records Uploaded, Care Team Updated, & History Updated if Applicable    []  Patient Declined Scheduling Pap Smear    []  Patient Will Schedule with External Provider & Care Team Updated if Applicable                  Colorectal Cancer Screening []  Colonoscopy Case Request / Referral / Home Test Order Placed    []  External Records Requested & Care Team Updated if Applicable    []  External Records Uploaded, Care Team Updated, & History Updated if Applicable    []  Patient Declined Completing Colon Cancer Screening    []  Patient Will Schedule with External Provider & Care Team Updated if Applicable    []  Fit Kit Mailed (add  the SmartPhrase under additional notes)    []  Reminded Patient to Complete Home Test                Diabetic Eye Exam []  Eye Exam Screening Order Placed    []  Eye Camera Scheduled or Optometry/Ophthalmology Referral Placed    []  External Records Requested & Care Team Updated if Applicable    []  External Records Uploaded, Care Team Updated, & History Updated if Applicable    []  Patient Declined Scheduling Eye Exam    []  Patient Will Schedule with External Provider & Care Team Updated if Applicable             Blood Pressure Control []  Primary Care Follow Up Visit Scheduled     []  Remote Blood Pressure Reading Captured    []  Patient Declined Remote Reading or Scheduling Appt - Escalated to PCP    []  Patient Will Call Back or Send Portal Message with Reading                 HbA1c & Other Labs []  Overdue Lab(s) Ordered    []  Overdue Lab(s) Scheduled    []  External Records Uploaded & Care Team Updated if Applicable    []  Primary Care Follow Up Visit Scheduled     []  Reminded Patient to Complete A1c Home Test    []  Patient Declined Scheduling Labs or Will Call Back to Schedule    []  Patient Will Schedule with External Provider / Order Routed, & Care Team Updated if Applicable           Primary Care Appointment []  Primary Care Appt Scheduled    []  Patient Declined Scheduling or Will Call Back to Schedule    []  Pt Established with External Provider, Updated Care Team, & Informed Pt to Notify Payor if Applicable           Medication Adherence /    Statin Use []  Primary Care Appointment Scheduled    []  Patient Reminded to  Prescription    []  Patient Declined, Provider Notified if Needed    []  Sent Provider Message to Review to Evaluate Pt for Statin, Add Exclusion Dx Codes, Document   Exclusion in Problem List, Change Statin Intensity Level to Moderate or High Intensity if Applicable                Osteoporosis Screening []  Dexa Order Placed    []  Dexa Appointment Scheduled    []  External  Records Requested & Care Team Updated    []  External Records Uploaded, Care Team Updated, & History Updated if Applicable    []  Patient Declined Scheduling Dexa or Will Call Back to Schedule    []  Patient Will Schedule with External Provider / Order Routed & Care Team Updated if Applicable       Additional Notes:

## 2025-07-30 NOTE — LETTER
AUTHORIZATION FOR RELEASE OF   CONFIDENTIAL INFORMATION    Dear [facility] medical records,    We are seeing Isidra Mcintosh, date of birth 1965, in the clinic at [facility]. Katie Frazier ACNP is the patient's PCP. Isidra Mcintosh has an outstanding lab/procedure at the time we reviewed her chart. In order to help keep her health information updated, she has authorized us to request the following medical record(s):        ( X )  MAMMOGRAM                                      (  )  COLONOSCOPY      (  )  PAP SMEAR                                          (  )  OUTSIDE LAB RESULTS     (  )  DEXA SCAN                                          (  )  EYE EXAM            (  )  FOOT EXAM                                          (  )  ENTIRE RECORD     (  )  OUTSIDE IMMUNIZATIONS                 (  )  _______________         Please fax records to Susana Woods LPN, Care Coordinator at [phone].      If you have any questions, please call Susana [phone]          Patient Name: Isidra Mcintosh  : 1965  Patient Phone #: [phone]            Isidra Mcintosh  MRN: [id number]  : 1965  Age: 60 y.o.  Sex: female         Patient/Legal Guardian Signature  This signature was collected at 2025           _______________________________   Printed Name/Relationship to Patient      Consent for Examination and Treatment: I hereby authorize the providers and employees of [facility] ([facility]) to provide medical treatment/services which includes, but is not limited to, performing and administering tests and diagnostic procedures that are deemed necessary, including, but not limited to, imaging examinations, blood tests and other laboratory procedures as may be required by the hospital, clinic, or may be ordered by my physician(s) or persons working under the general and/or special instructions of my physician(s).      I understand and agree that this consent covers all  authorized persons, including but not limited to physicians, residents, nurse practitioners, physicians' assistants, specialists, consultants, student nurses, and independently contracted physicians, who are called upon by the physician in charge, to carry out the diagnostic procedures and medical or surgical treatment.     I hereby authorize [facility] to retain or dispose of any specimens or tissue, should there be such remaining from any test or procedure.     I hereby authorize and give consent for [facility] providers and employees to take photographs, images or videotapes of such diagnostic, surgical or treatment procedures of Patient as may be required by [facility] or as may be ordered by a physician. I further acknowledge and agree that [facility] may use cameras or other devices for patient monitoring.     I am aware that the practice of medicine is not an exact science, and I acknowledge that no guarantees have been made to me as to the outcome of any tests, procedures or treatment.     Authorization for Release of Information: I understand that my insurance company and/or their agents may need information necessary to make determinations about payment/reimbursement. I hereby provide authorization to release to all insurance companies, their successors, assignees, other parties with whom they may have contracted, or others acting on their behalf, that are involved with payment for any hospital and/or clinic charges incurred by the patient, any information that they request and deem necessary for payment/reimbursement, and/or quality review.  I further authorize the release of my health information to physicians or other health care practitioners on staff who are involved in my health care now and in the future, and to other health care providers, entities, or institutions for the purpose of my continued care and treatment, including referrals.     REGISTRATION AUTHORIZATION  Form No. 10106 (Rev. 3/25/2024)    Page 1  of 3                       Medicare Patient's Certification and Authorization to Release Information and Payment Request:  I certify that the information given by me in applying for payment under Title XVIII of the Social Security Act is correct. I authorize any ngo of medical or other information about me to release to the Social SecurityAdministration, or its intermediaries or carriers, any information needed for this or a related Medicare claim. I request that payment of authorized benefits be made on my behalf.     Assignment of Insurance Benefits:   I hereby authorize any and all insurance companies, health plans, defined   benefit plans, health insurers or any entity that is or may be responsible for payment of my medical expenses to pay all hospital and medical benefits now due, and to become due and payable to me under any hospital benefits, sick benefits, injury benefits or any other benefit for services rendered to me, including Major Medical Benefits, direct to [facility] and all independently contracted physicians. I assign any and all rights that I may have against any and all insurance companies, health plans, defined benefit plans, health insurers or any entity that is or may be responsible for payment of my medical expenses, including, but not limited to any right to appeal a denial of a claim, any right to bring any action, lawsuit, administrative proceeding, or other cause of action on my behalf. I specifically assign my right to pursue litigation against any and all insurance companies, health plans, defined benefit plans, health insurers or any entity that is or may be responsible for payment of my medical expenses based upon a refusal to pay charges.            E. Valuables: It is understood and agreed that [facility] is not liable for the damage to or loss of any money, jewelry,   documents, dentures, eye glasses, hearing aids, prosthetics, or other property of value.     F. Computer Equipment: I  understand and agree that should I choose to use computer equipment owned by [facility] or if I choose to access the Internet via [facility]s network, I do so at my own risk. [facility] is not responsible for any damage to my computer equipment or to any damages of any type that might arise from my loss of equipment or data.     G. Acceptance of Financial Responsibility:  I agree that in consideration of the services and   supplies that have been   or will be furnished to the patient, I am hereby obligated to pay all charges made for or on the account of the patient according to the standard rates (in effect at the time the services and supplies are delivered) established by [facility], including its Patient Financial Assistance Policy to the extent it is applicable. I understand that I am responsible for all charges, or portions thereof, not covered by insurance or other sources. Patient refunds will be distributed only after balances at all [facility] facilities are paid.     H. Communication Authorization:  I hereby authorize [facility] and its representatives, along with any billing service   or  who may work on their behalf, to contact me on   my cell phone and/or home phone using pre- recorded messages, artificial voice messages, automatic telephone dialing devices or other computer assisted technology, or by electronic      mail, text messaging, or by any other form of electronic communication. This includes, but is not limited to, appointment reminders, yearly physical exam reminders, preventive care reminders, patient campaigns, welcome calls, and calls about account balances on my account or any account on which I am listed as a guarantor. I understand I have the right to opt out of these communications at any time.      Relationship  Between  Facility and  Provider:      I understand that some, but not all, providers furnishing services to the patient are not employees or agents of [facility]. The patient is  under the care and supervision of his/her attending physician, and it is the responsibility of the facility and its nursing staff to carry out the instructions of such physicians. It is the responsibility of the patient's physician/designee to obtain the patient's informed consent, when required, for medical or surgical treatment, special diagnostic or therapeutic procedures, or hospital services rendered for the patient under the special instructions of the physician/designee.           REGISTRATION AUTHORIZATION  Form No. 58422 (Rev. 3/25/2024)    Page 2 of 3                       Immunizations: [facility] shares immunization information with state sponsored health departments to help you and your doctor keep track of your immunization records. By signing, you consent to have this information shared with the health department in your state:                                [place] - LINKS ([place] Immunization Network for Kids Statewide)                                [place] - MIIX ([place] Immunization Information eXchange)                                [place] - ImmPRINT (Immunization Patient Registry with Integrated Technology)     TERM: This authorization is valid for this and subsequent care/treatment I receive at [facility] and will remain valid unless/until revoked in writing by me.     [facility]: As used in this document, [facility] means all [facility] owned and managed facilities, including, but not limited to, all health centers, surgery centers, clinics, urgent care centers, and hospitals.         [facility] complies with applicable Federal civil rights laws and does not discriminate on the basis of race, color, national origin, age, disability, or sex.  ATENCIÓN: si habla español, tiene a wright disposición servicios gratuitos de asistencia lingüística. Mari jacobs [phone].  [facility] Ý: N?u b?n nói Ti?ng Vi?t, có các d?ch v? h? tr? ngôn ng? mi?n phí dành cho b?n. G?i s?  [phone].        REGISTRATION AUTHORIZATION  Form No. 21091 (Rev. 3/25/2024)   Page 3 of 3

## 2025-07-30 NOTE — PROGRESS NOTES
IZZY Garcia   [facility] [address] [place] [address]  [phone]      PATIENT NAME: Isidra Mcintosh  : 1965  DATE: 25  MRN: [id number]      Billing Provider: IZZY Garcia  Level of Service:   Patient PCP Information       Provider PCP Type    IZZY Garcia General            Reason for Visit / Chief Complaint: Follow-up and Hypertension (.Shingles Vaccine(1 of 2) Never done/Mammogram due on 2024/Diabetes Urine Screening due on 2025/Hemoglobin A1c due on 2025 )       Update PCP  Update Chief Complaint         History of Present Illness / Problem Focused Workflow     Isidra Mcintosh presents to the clinic with Follow-up and Hypertension (.Shingles Vaccine(1 of 2) Never done/Mammogram due on 2024/Diabetes Urine Screening due on 2025/Hemoglobin A1c due on 2025 )     Pt presents for routine follow up with lab and med refills. Overall doing well.      BP appears  controlled today. Home meds reviewed    The current medical regimen is effective;  continue present plan and medications. Recommended patient to check home readings to monitor and see me for followup as scheduled or sooner as needed.   Discussed sodium restriction, maintaining ideal body weight and regular exercise program as physiologic means to continue to achieve blood pressure control in addition to medication compliance.  Patient was educated that both decongestant and anti-inflammatory medication may raise blood pressure.    Advised to monitor BP at home. Advised on optimal BP readings - SBP < 130 & DBP < 80. Advised to call office for any persistent BP elevation and may have to prescribe or adjust BP med(s).  Recommended DASH diet, stay well hydrated with water daily, eliminate or decrease caffeinated and high calorie drinks, increase physical activity, and lose weight if BMI  > 25.0. Written patient education information provided to patient with goals and recommendations to assist with BP management.     Discussed need for low fat/low cholesterol diet, regular exercise, and weight control.   Cardiovascular risk and specific lipid/LDL goals reviewed.    Health maintenance: recommend Shingles Vaccines         Review of Systems     Review of Systems   Constitutional:  Negative for fatigue and fever.   HENT:  Positive for postnasal drip. Negative for nasal congestion and sore throat.    Eyes:  Negative for visual disturbance.   Respiratory:  Negative for chest tightness and shortness of breath.    Cardiovascular:  Negative for chest pain and leg swelling.   Gastrointestinal:  Negative for abdominal pain and change in bowel habit.   Endocrine: Negative for polydipsia, polyphagia and polyuria.   Genitourinary:  Negative for dysuria and hematuria.   Musculoskeletal:  Negative for back pain and leg pain.   Integumentary:  Negative for rash.   Neurological:  Negative for dizziness, syncope, weakness and light-headedness.        Medical / Social / Family History     Past Medical History:   Diagnosis Date    Essential hypertension, malignant     Vitamin D deficiency        Past Surgical History:   Procedure Laterality Date    APPENDECTOMY  2016    BREAST SURGERY       SECTION      HYSTERECTOMY         Social History  Ms. Mcintosh  reports that she has never smoked. She has never been exposed to tobacco smoke. She has never used smokeless tobacco. She reports that she does not drink alcohol and does not use drugs.    Family History  Ms. Mcintosh's family history includes COPD in her father; Diabetes in her father; Heart disease in her maternal grandmother and maternal uncle; Hypertension in her father and mother; Kidney disease in her father.    Medications and Allergies     Medications  Outpatient Medications Marked as Taking for the 25 encounter (Office Visit) with Katie Frazier  S, ACNP   Medication Sig Dispense Refill    estradioL (ESTRACE) 0.01 % (0.1 mg/gram) vaginal cream Place 0.5 g vaginally 3 (three) times a week. 42.5 g 1    fluticasone propionate (FLONASE) 50 mcg/actuation nasal spray SMARTSI-2 Spray(s) Both Nares Daily PRN 16 g 3    vitamin D (VITAMIN D3) 1000 units Tab Take 1,000 Units by mouth once daily.         Allergies  Review of patient's allergies indicates:  No Known Allergies    Physical Examination     Vitals:    25 0832   BP: 130/86   Pulse: 61   Resp: 16   Temp: 98 °F (36.7 °C)     Physical Exam  Eyes:      Pupils: Pupils are equal, round, and reactive to light.   Cardiovascular:      Rate and Rhythm: Normal rate and regular rhythm.      Heart sounds: Normal heart sounds. No murmur heard.  Pulmonary:      Breath sounds: Normal breath sounds. No wheezing, rhonchi or rales.   Abdominal:      General: Bowel sounds are normal.      Palpations: Abdomen is soft.   Musculoskeletal:         General: No swelling.      Cervical back: Normal range of motion and neck supple.   Skin:     General: Skin is warm and dry.   Neurological:      Mental Status: She is alert and oriented to person, place, and time.          Lab Results   Component Value Date    WBC 4.04 (L) 2025    HGB 13.5 2025    HCT 43.3 2025    MCV 85.7 2025     2025        Sodium   Date Value Ref Range Status   2025 141 136 - 145 mmol/L Final     Potassium   Date Value Ref Range Status   2025 3.1 (L) 3.5 - 5.1 mmol/L Final     Chloride   Date Value Ref Range Status   2025 102 98 - 107 mmol/L Final     CO2   Date Value Ref Range Status   2025 27 22 - 29 mmol/L Final     Glucose   Date Value Ref Range Status   2025 80 74 - 100 mg/dL Final     BUN   Date Value Ref Range Status   2025 15 10 - 20 mg/dL Final     Creatinine   Date Value Ref Range Status   2025 0.93 0.55 - 1.02 mg/dL Final     Calcium   Date Value Ref Range Status  "  01/27/2025 9.9 8.4 - 10.2 mg/dL Final     Total Protein   Date Value Ref Range Status   01/27/2025 7.7 6.4 - 8.3 g/dL Final     Albumin   Date Value Ref Range Status   01/27/2025 3.8 3.5 - 5.0 g/dL Final     Bilirubin, Total   Date Value Ref Range Status   01/27/2025 0.5 <=1.5 mg/dL Final     Alk Phos   Date Value Ref Range Status   01/27/2025 80 40 - 150 U/L Final     AST   Date Value Ref Range Status   01/27/2025 64 (H) 5 - 34 U/L Final     ALT   Date Value Ref Range Status   01/27/2025 14 <=55 U/L Final     Anion Gap   Date Value Ref Range Status   01/27/2025 15 7 - 16 mmol/L Final     eGFR   Date Value Ref Range Status   01/27/2025 71 >=60 mL/min/1.73m2 Final     Comment:     Estimated GFR calculated using the CKD-EPI creatinine (2021) equation.      Lab Results   Component Value Date    HGBA1C 5.7 07/17/2024      Lab Results   Component Value Date    CHOL 169 01/27/2025    CHOL 147 07/17/2024    CHOL 188 01/17/2024     Lab Results   Component Value Date    HDL 35 01/27/2025    HDL 43 07/17/2024    HDL 39 (L) 01/17/2024     Lab Results   Component Value Date    LDLCALC 113 01/27/2025    LDLCALC 81 07/17/2024    LDLCALC 121 01/17/2024     No results found for: "DLDL"  Lab Results   Component Value Date    TRIG 106 01/27/2025    TRIG 114 07/17/2024    TRIG 138 01/17/2024     Lab Results   Component Value Date    CHOLHDL 4.8 01/27/2025    CHOLHDL 3.4 07/17/2024    CHOLHDL 4.8 01/17/2024      No results found for: "TSH", "O1TDGRJ", "J6AXSAT", "THYROIDAB", "FREET4"     Assessment and Plan (including Health Maintenance)      Problem List  Smart Sets  Document Outside HM   :    Plan:     1. Benign hypertension  Overview:  bp appears well controlled today  cont home meds    Assessment & Plan:  BP Readings from Last 3 Encounters:   07/30/25 130/86   01/27/25 127/79   11/04/24 135/85    Well controlled  Refill HCTZ  The current medical regimen is effective;  continue present plan and medications.      Orders:  -     CBC " Auto Differential; Future; Expected date: 07/30/2025  -     Comprehensive Metabolic Panel; Future; Expected date: 07/30/2025  -     Lipid Panel; Future; Expected date: 07/30/2025  -     Microalbumin/Creatinine Ratio, Urine; Future; Expected date: 07/30/2025  -     hydroCHLOROthiazide (HYDRODIURIL) 25 MG tablet; Take 1 tablet (25 mg total) by mouth once daily.  Dispense: 90 tablet; Refill: 1    2. Screening for diabetes mellitus  -     Hemoglobin A1C; Future; Expected date: 07/30/2025    3. Breast cancer screening by mammogram  -     Mammo Digital Screening Bilat w/ Pop (XPD); Future; Expected date: 07/30/2025    4. Seasonal allergies  -     cetirizine (ZYRTEC) 10 MG tablet; Take 1 tablet (10 mg total) by mouth every other day.  Dispense: 90 tablet; Refill: 1         There are no Patient Instructions on file for this visit.     Health Maintenance Due   Topic Date Due    Shingles Vaccine (1 of 2) Never done    Mammogram  08/22/2024    Diabetes Urine Screening  07/17/2025    Hemoglobin A1c  07/17/2025         Health Maintenance Topics with due status: Not Due       Topic Last Completion Date    Colorectal Cancer Screening 02/23/2018    Influenza Vaccine 01/27/2025    Lipid Panel 01/27/2025    RSV Vaccine (Age 60+ and Pregnant patients) Not Due       Future Appointments   Date Time Provider Department Center   2/5/2026  8:00 AM Katie Frazier ACNP [facility] [place]            Signature:  IZZY Garcia [facility] [address] [place] [address]  [phone]    Date of encounter: 7/30/25

## 2025-08-12 ENCOUNTER — PATIENT OUTREACH (OUTPATIENT)
Facility: HOSPITAL | Age: 60
End: 2025-08-12
Payer: OTHER GOVERNMENT